# Patient Record
Sex: FEMALE | Race: WHITE | Employment: FULL TIME | URBAN - METROPOLITAN AREA
[De-identification: names, ages, dates, MRNs, and addresses within clinical notes are randomized per-mention and may not be internally consistent; named-entity substitution may affect disease eponyms.]

---

## 2019-06-17 ENCOUNTER — TELEPHONE (OUTPATIENT)
Dept: PRIMARY CARE CLINIC | Age: 39
End: 2019-06-17

## 2019-07-16 ENCOUNTER — OFFICE VISIT (OUTPATIENT)
Dept: PRIMARY CARE CLINIC | Age: 39
End: 2019-07-16

## 2019-07-16 VITALS
RESPIRATION RATE: 18 BRPM | HEART RATE: 72 BPM | TEMPERATURE: 98.4 F | HEIGHT: 64 IN | DIASTOLIC BLOOD PRESSURE: 68 MMHG | OXYGEN SATURATION: 98 % | BODY MASS INDEX: 35.51 KG/M2 | WEIGHT: 208 LBS | SYSTOLIC BLOOD PRESSURE: 127 MMHG

## 2019-07-16 DIAGNOSIS — E66.9 OBESITY (BMI 30-39.9): ICD-10-CM

## 2019-07-16 DIAGNOSIS — N92.6 IRREGULAR PERIODS/MENSTRUAL CYCLES: ICD-10-CM

## 2019-07-16 DIAGNOSIS — R23.2 HOT FLASHES: ICD-10-CM

## 2019-07-16 DIAGNOSIS — E03.9 ACQUIRED HYPOTHYROIDISM: Primary | ICD-10-CM

## 2019-07-16 RX ORDER — GLUCOSAMINE SULFATE 1500 MG
4000 POWDER IN PACKET (EA) ORAL DAILY
COMMUNITY

## 2019-07-16 RX ORDER — MULTIVIT WITH MINERALS/HERBS
2 TABLET ORAL DAILY
COMMUNITY
End: 2021-10-15 | Stop reason: ALTCHOICE

## 2019-07-16 NOTE — PROGRESS NOTES
Chief Complaint   Patient presents with    New Patient    Establish Care    Thyroid Problem     hypothyroidism    Stress    Anxiety    Joint Pain     \"stiff joints\"    Hair/Scalp Problem     hair falling out

## 2019-07-16 NOTE — PROGRESS NOTES
Written by Paulette Hearn, as dictated by Dr. Arash Daniels MD.    Jonelle Martel is a 41 y.o. female. HPI  The patient comes in today to establish care. She is taking Synthroid 150 mcg for hypothyroidism, noting she was switched from her previous medication on 04/30/2019. Patient ran out of her medication yesterday. The patient reports feeling fatigued and hair loss. Denies constipation and insomnia. She eats red meat regularly. Patient would like to have her T4 checked. Patient weighs 208 lbs today and reports weight gain. Her weight gain started after she quit smoking. The patient has been following a healthy diet and is trying to increase her exercise. She admits that she could put more effort into lose weight. Previously she tried the keto diet, but was only able to lose 3 lbs. The patient reports significant hot flashes since her tubal ligation on 03/16/2016. She is also having mood swings, which have worsened. Menstrual cycle is irregular. Pt is taking vitamin D daily. S/p cholecystectomy. Patient Active Problem List   Diagnosis Code    Normal delivery O80        Current Outpatient Medications on File Prior to Visit   Medication Sig Dispense Refill    cholecalciferol (VITAMIN D3) 1,000 unit cap Take 4,000 Units by mouth daily.  b complex vitamins tablet Take 2 Tabs by mouth daily.  levothyroxine (SYNTHROID) 88 mcg tablet Take 150 mcg by mouth Daily (before breakfast).  PRENATAL VITS W-CA,FE,FA,<1MG, (PRENATAL VITAMIN PO) Take 1 Cap by mouth daily. No current facility-administered medications on file prior to visit.         Allergies   Allergen Reactions    Amoxicillin Anaphylaxis    Ampicillin Anaphylaxis    Penicillins Anaphylaxis       Past Medical History:   Diagnosis Date    Diabetes ([facility] [address]     gest DM- diet controlled    Thyroid activity decreased 01/14    hypo       Past Surgical History:   Procedure Laterality Date   DELIVERY ONLY  2004    emergency    HX CHOLECYSTECTOMY      HX OTHER SURGICAL  10/2004    lap choly    HX TUBAL LIGATION  2016       Family History   Problem Relation Age of Onset    Diabetes Maternal Grandmother     Stroke Maternal Grandmother     Dementia Maternal Grandfather     Alcohol abuse Neg Hx     Arthritis-osteo Neg Hx     Asthma Neg Hx     Bleeding Prob Neg Hx     Cancer Neg Hx     Elevated Lipids Neg Hx     Headache Neg Hx     Heart Disease Neg Hx     Hypertension Neg Hx     Lung Disease Neg Hx     Psychiatric Disorder Neg Hx     Migraines Neg Hx     Mental Retardation Neg Hx        Social History     Socioeconomic History    Marital status:      Spouse name: Not on file    Number of children: Not on file    Years of education: Not on file    Highest education level: Not on file   Occupational History    Not on file   Social Needs    Financial resource strain: Not on file    Food insecurity:     Worry: Not on file     Inability: Not on file    Transportation needs:     Medical: Not on file     Non-medical: Not on file   Tobacco Use    Smoking status: Former Smoker     Years: 15.00     Start date: 3/5/2018     Last attempt to quit: 3/5/2018     Years since quittin.3    Smokeless tobacco: Never Used   Substance and Sexual Activity    Alcohol use: Yes     Comment: SOCIALLY ONCE A MONTH    Drug use: No    Sexual activity: Yes     Partners: Male   Lifestyle    Physical activity:     Days per week: Not on file     Minutes per session: Not on file    Stress: Not on file   Relationships    Social connections:     Talks on phone: Not on file     Gets together: Not on file     Attends Hoahaoism service: Not on file     Active member of club or organization: Not on file     Attends meetings of clubs or organizations: Not on file     Relationship status: Not on file    Intimate partner violence:     Fear of current or ex partner: Not on file Emotionally abused: Not on file     Physically abused: Not on file     Forced sexual activity: Not on file   Other Topics Concern    Not on file   Social History Narrative    Not on file       Review of Systems   Constitutional: Positive for diaphoresis and malaise/fatigue. HENT: Negative for congestion. Eyes: Negative for blurred vision and pain. Respiratory: Negative for cough and shortness of breath. Cardiovascular: Negative for chest pain and palpitations. Gastrointestinal: Negative for abdominal pain, constipation and heartburn. Genitourinary: Negative for frequency and urgency. +irreglar menstrual cycle   Musculoskeletal: Negative for joint pain and myalgias. Skin:        +hair loss   Neurological: Negative for dizziness, tingling, sensory change, weakness and headaches. Psychiatric/Behavioral: Negative for depression, memory loss and substance abuse. The patient does not have insomnia.         +mood swings     Visit Vitals  /68   Pulse 72   Temp 98.4 °F (36.9 °C) (Oral)   Resp 18   Ht 5' 4\" (1.626 m)   Wt 208 lb (94.3 kg)   LMP 06/28/2019   SpO2 98%   BMI 35.70 kg/m²       Physical Exam   Constitutional: She is oriented to person, place, and time. She appears well-developed. No distress. Obese   HENT:   Right Ear: External ear normal.   Left Ear: External ear normal.   Nose: Right sinus exhibits no maxillary sinus tenderness. Left sinus exhibits no maxillary sinus tenderness. Eyes: Conjunctivae and EOM are normal. Right eye exhibits no discharge. Left eye exhibits no discharge. Neck: Normal range of motion. Neck supple. No thyromegaly present. Cardiovascular: Normal rate, regular rhythm and normal heart sounds. Pulmonary/Chest: Effort normal and breath sounds normal. She has no wheezes. Abdominal: Soft. Bowel sounds are normal. There is no tenderness. Lymphadenopathy:     She has no cervical adenopathy.    Neurological: She is alert and oriented to person, place, and time. Skin: She is not diaphoretic. Psychiatric: She has a normal mood and affect. Her behavior is normal.   Nursing note and vitals reviewed. ASSESSMENT and PLAN    ICD-10-CM ICD-9-CM    1. Acquired hypothyroidism E03.9 244.9 TSH RFX ON ABNORMAL TO FREE T4      FSH AND LH      CBC W/O DIFF    TSH reflex on abnormal, FSH and LH, and CBC ordered. 2. Obesity (BMI 30-39. 9) E66.9 278.00 Encouraged healthy diet. Advised pt to purchase a FitBit or similar device. Discussed that a healthy lifestyle requires 5,000-7,000 steps daily, but weight loss requires 10,000 steps daily. I recommend the patient download the Weight Watchers rayray to help track food consumption. The patient should not use their weekly points, as weekly points are for weight maintenance and the patient is trying to lose weight. The patient should eat plenty of fruits and vegetables. Encouraged the patient to exercise. 3. Hot flashes R23.2 782.62 FSH and LH ordered. If needed, will refer to OB/GYN for hormonal treatment. 4. Irregular periods/menstrual cycles N92.6 626.4 FSH and LH ordered. If needed, will refer to OB/GYN for hormonal treatment. Discussed her irregular menstrual cycle may be due to hypothyroidism. This plan was reviewed with the patient and patient agrees. All questions were answered. This scribe documentation was reviewed by me and accurately reflects the examination and decisions made by me. This note will not be viewable in [address]

## 2019-07-17 LAB
ERYTHROCYTE [DISTWIDTH] IN BLOOD BY AUTOMATED COUNT: 13.5 % (ref 12.3–15.4)
FSH SERPL-ACNC: 6.9 MIU/ML
HCT VFR BLD AUTO: 39.9 % (ref 34–46.6)
HGB BLD-MCNC: 13.4 G/DL (ref 11.1–15.9)
LH SERPL-ACNC: 7.2 MIU/ML
MCH RBC QN AUTO: 30.4 PG (ref 26.6–33)
MCHC RBC AUTO-ENTMCNC: 33.6 G/DL (ref 31.5–35.7)
MCV RBC AUTO: 91 FL (ref 79–97)
PLATELET # BLD AUTO: 339 X10E3/UL (ref 150–450)
RBC # BLD AUTO: 4.41 X10E6/UL (ref 3.77–5.28)
TSH SERPL DL<=0.005 MIU/L-ACNC: 2.12 UIU/ML (ref 0.45–4.5)
WBC # BLD AUTO: 7.4 X10E3/UL (ref 3.4–10.8)

## 2019-07-18 NOTE — PROGRESS NOTES
Denise Todd, surprisingly, your thyroid number ( TSH) and hormonal result came back fine . Let`s make an appointment so we can discuss next step.

## 2019-07-29 ENCOUNTER — OFFICE VISIT (OUTPATIENT)
Dept: PRIMARY CARE CLINIC | Age: 39
End: 2019-07-29

## 2019-07-29 VITALS
TEMPERATURE: 97.9 F | HEART RATE: 60 BPM | HEIGHT: 64 IN | DIASTOLIC BLOOD PRESSURE: 71 MMHG | SYSTOLIC BLOOD PRESSURE: 125 MMHG | OXYGEN SATURATION: 100 % | RESPIRATION RATE: 16 BRPM | BODY MASS INDEX: 35.71 KG/M2 | WEIGHT: 209.2 LBS

## 2019-07-29 DIAGNOSIS — E66.9 OBESITY (BMI 30-39.9): ICD-10-CM

## 2019-07-29 DIAGNOSIS — E03.9 ACQUIRED HYPOTHYROIDISM: ICD-10-CM

## 2019-07-29 DIAGNOSIS — E66.01 SEVERE OBESITY (HCC): ICD-10-CM

## 2019-07-29 DIAGNOSIS — R53.82 CHRONIC FATIGUE: Primary | ICD-10-CM

## 2019-07-29 DIAGNOSIS — R12 HEART BURN: ICD-10-CM

## 2019-07-29 RX ORDER — OMEPRAZOLE 40 MG/1
40 CAPSULE, DELAYED RELEASE ORAL DAILY
Qty: 30 CAP | Refills: 0 | Status: SHIPPED | OUTPATIENT
Start: 2019-07-29 | End: 2019-08-28

## 2019-07-29 RX ORDER — PHENTERMINE HYDROCHLORIDE 37.5 MG/1
37.5 TABLET ORAL
Qty: 14 TAB | Refills: 0 | Status: SHIPPED | OUTPATIENT
Start: 2019-07-29 | End: 2019-08-12

## 2019-07-29 NOTE — PROGRESS NOTES
Written by Leatha Meredith, as dictated by Dr. Gage Regalado MD.    Yuri Valentino is a 41 y.o. female. HPI  The patient presents today to discuss labs. Labs were drawn on 2019: CBC, FSH and LH, and TSH were normal. Compliant on Synthroid 88 mcg. Today she weighs 209 lbs and has gained weight from 208 lbs on 2019. Patient is still struggling with weight gain, noting she has increased her exercise. She has been watching her diet, reducing carbohydrates and soda. The pt is planning to see a hypnotherapist to help her work through potential issues. Patient tried phen phen as a teenager, but otherwise has not tried medication for weight loss. She has significant heartburn for which she takes OTC Prilosec as needed. Patient is interested in prescription medication for her sxs. Patient has never taken Prozac. Denies cravings for cigarettes. Patient Active Problem List   Diagnosis Code   (none) - all problems resolved or deleted        Current Outpatient Medications on File Prior to Visit   Medication Sig Dispense Refill    cholecalciferol (VITAMIN D3) 1,000 unit cap Take 4,000 Units by mouth daily.  b complex vitamins tablet Take 2 Tabs by mouth daily.  PRENATAL VITS W-CA,FE,FA,<1MG, (PRENATAL VITAMIN PO) Take 1 Cap by mouth daily.  levothyroxine (SYNTHROID) 88 mcg tablet Take 150 mcg by mouth Daily (before breakfast). No current facility-administered medications on file prior to visit.         Allergies   Allergen Reactions    Amoxicillin Anaphylaxis    Ampicillin Anaphylaxis    Penicillins Anaphylaxis       Past Medical History:   Diagnosis Date    Diabetes ([facility] [address]     gest DM- diet controlled    Thyroid activity decreased     hypo       Past Surgical History:   Procedure Laterality Date     DELIVERY ONLY  2004    emergency    HX CHOLECYSTECTOMY      HX OTHER SURGICAL  10/2004    lap choly    HX TUBAL LIGATION  2016       Family History   Problem Relation Age of Onset    Diabetes Maternal Grandmother     Stroke Maternal Grandmother     Dementia Maternal Grandfather     Alcohol abuse Neg Hx     Arthritis-osteo Neg Hx     Asthma Neg Hx     Bleeding Prob Neg Hx     Cancer Neg Hx     Elevated Lipids Neg Hx     Headache Neg Hx     Heart Disease Neg Hx     Hypertension Neg Hx     Lung Disease Neg Hx     Psychiatric Disorder Neg Hx     Migraines Neg Hx     Mental Retardation Neg Hx        Social History     Socioeconomic History    Marital status:      Spouse name: Not on file    Number of children: Not on file    Years of education: Not on file    Highest education level: Not on file   Occupational History    Not on file   Social Needs    Financial resource strain: Not on file    Food insecurity:     Worry: Not on file     Inability: Not on file    Transportation needs:     Medical: Not on file     Non-medical: Not on file   Tobacco Use    Smoking status: Former Smoker     Years: 15.00     Start date: 3/5/2018     Last attempt to quit: 3/5/2018     Years since quittin.4    Smokeless tobacco: Never Used   Substance and Sexual Activity    Alcohol use: Yes     Comment: SOCIALLY ONCE A MONTH    Drug use: No    Sexual activity: Yes     Partners: Male   Lifestyle    Physical activity:     Days per week: Not on file     Minutes per session: Not on file    Stress: Not on file   Relationships    Social connections:     Talks on phone: Not on file     Gets together: Not on file     Attends Restorationist service: Not on file     Active member of club or organization: Not on file     Attends meetings of clubs or organizations: Not on file     Relationship status: Not on file    Intimate partner violence:     Fear of current or ex partner: Not on file     Emotionally abused: Not on file     Physically abused: Not on file     Forced sexual activity: Not on file   Other Topics Concern    Not on file   Social History Narrative    Not on file       Office Visit on 07/16/2019   Component Date Value Ref Range Status    TSH 07/16/2019 2.120  0.450 - 4.500 uIU/mL Final    Luteinizing hormone 07/16/2019 7.2  mIU/mL Final    FSH 07/16/2019 6.9  mIU/mL Final    WBC 07/16/2019 7.4  3.4 - 10.8 x10E3/uL Final    RBC 07/16/2019 4.41  3.77 - 5.28 x10E6/uL Final    HGB 07/16/2019 13.4  11.1 - 15.9 g/dL Final    HCT 07/16/2019 39.9  34.0 - 46.6 % Final    MCV 07/16/2019 91  79 - 97 fL Final    MCH 07/16/2019 30.4  26.6 - 33.0 pg Final    MCHC 07/16/2019 33.6  31.5 - 35.7 g/dL Final    RDW 07/16/2019 13.5  12.3 - 15.4 % Final    PLATELET 85/48/4865 840  150 - 450 x10E3/uL Final       Review of Systems   Constitutional: Positive for malaise/fatigue. Respiratory: Negative for cough and shortness of breath. Gastrointestinal: Positive for heartburn. Negative for abdominal pain. Musculoskeletal: Negative for joint pain and myalgias. Neurological: Negative for dizziness, tingling, sensory change, weakness and headaches. Psychiatric/Behavioral: Negative for depression, memory loss and substance abuse. Visit Vitals  /71 (BP 1 Location: Left arm, BP Patient Position: Sitting)   Pulse 60   Temp 97.9 °F (36.6 °C) (Oral)   Resp 16   Ht 5' 4\" (1.626 m)   Wt 209 lb 3.2 oz (94.9 kg)   LMP 07/29/2019   SpO2 100%   BMI 35.91 kg/m²       Physical Exam   Constitutional: She is oriented to person, place, and time. She appears well-developed. No distress. Obese   HENT:   Right Ear: External ear normal.   Left Ear: External ear normal.   Eyes: Conjunctivae and EOM are normal. Right eye exhibits no discharge. Left eye exhibits no discharge. Neck: Normal range of motion. Neck supple. Cardiovascular: Normal rate and regular rhythm. Pulmonary/Chest: Effort normal and breath sounds normal. She has no wheezes. Abdominal: Soft. Bowel sounds are normal. There is no tenderness.    Lymphadenopathy:     She has no cervical adenopathy. Neurological: She is alert and oriented to person, place, and time. Skin: She is not diaphoretic. Psychiatric: She has a normal mood and affect. Her behavior is normal.   Nursing note and vitals reviewed. ASSESSMENT and PLAN    ICD-10-CM ICD-9-CM    1. Chronic fatigue R53.82 780.79 Phentermine should improve her fatigue. 2. Acquired hypothyroidism E03.9 244.9 Compliant on Synthroid 88 mcg. TSH came back  normal.    3. Obesity (BMI 30-39. 9) E66.9 278.00 phentermine (ADIPEX-P) 37.5 mg tablet script given to patient. Phentermine 37.5 mg prescribed x 2 weeks. Potential side effects were discussed. She should start with 1/2 tab PO after breakfast. She must lose 4-5 lbs in 2 weeks for me to continue prescribing phentermine. EKG will be performed at 2 week follow-up. No history of glaucoma. 4. Heart burn R12 787.1 omeprazole (PRILOSEC) 40 mg capsule sent to pharmacy. Prilosec 40 mg prescribed. Potential side effects were discussed. She should take 1 tab PO daily first thing in the morning, 10 minutes before taking Synthroid. If her sxs do not improve, I will refer to GI. This plan was reviewed with the patient and patient agrees. All questions were answered. This scribe documentation was reviewed by me and accurately reflects the examination and decisions made by me. This note will not be viewable in [address]

## 2019-07-29 NOTE — PROGRESS NOTES
Visit Vitals  /71 (BP 1 Location: Left arm, BP Patient Position: Sitting)   Pulse 60   Temp 97.9 °F (36.6 °C) (Oral)   Resp 16   Ht 5' 4\" (1.626 m)   Wt 209 lb 3.2 oz (94.9 kg)   SpO2 100%   BMI 35.91 kg/m²           Chief Complaint   Patient presents with    Follow-up     Labs     Epigastric Pain     Patient states she has been having  s/s have been present for  a while now            Patient  up to date, patient is not interested in meebee at this time. 1. Have you been to the ER, urgent care clinic since your last visit? Hospitalized since your last visit? Denies     2. Have you seen or consulted any other health care providers outside of the [address] [place] [address] since your last visit? Include any pap smears or colon screening.  OB/GYN

## 2019-08-12 ENCOUNTER — OFFICE VISIT (OUTPATIENT)
Dept: PRIMARY CARE CLINIC | Age: 39
End: 2019-08-12

## 2019-08-12 VITALS
HEIGHT: 64 IN | HEART RATE: 64 BPM | TEMPERATURE: 98.7 F | BODY MASS INDEX: 35 KG/M2 | SYSTOLIC BLOOD PRESSURE: 129 MMHG | WEIGHT: 205 LBS | DIASTOLIC BLOOD PRESSURE: 82 MMHG | RESPIRATION RATE: 16 BRPM | OXYGEN SATURATION: 98 %

## 2019-08-12 DIAGNOSIS — R53.83 OTHER FATIGUE: ICD-10-CM

## 2019-08-12 DIAGNOSIS — Z79.899 MEDICATION MANAGEMENT: ICD-10-CM

## 2019-08-12 DIAGNOSIS — R12 HEART BURN: Primary | ICD-10-CM

## 2019-08-12 DIAGNOSIS — E66.9 OBESITY (BMI 30-39.9): ICD-10-CM

## 2019-08-12 RX ORDER — PHENTERMINE HYDROCHLORIDE 37.5 MG/1
37.5 TABLET ORAL
Qty: 30 TAB | Refills: 0 | Status: SHIPPED | OUTPATIENT
Start: 2019-08-12 | End: 2019-09-11

## 2019-08-12 NOTE — PROGRESS NOTES
Written by Alta Scott, as dictated by Dr. Miah Sanders MD.    Bernie Wheat is a 41 y.o. female. HPI  The patient presents today for medication evaluation follow-up of phentermine. EKG showed: Sinus rhythm, within normal limits. Pt weighs 205 lbs today, which has decreased from 209 lbs in 07/29/19. Pt has been taking phentermine 1/2 tab daily after work, and after she eats at night. She reports that she has been feeling tired due to lack of sleep, and being easy to wake at night, which she attributes to taking the medication late in the morning . She reports an improvement in her cravings, and a decrease in \"sick\" hunger, and more energy. She does not feel fatigued as before, and attributes the tiredness she currently feels due to lack of sleep. She reports that she has been drinking more water. Pt reports that she is walking more, and is trying to exercise when she can, incorporating exercise into her daily routine. She does not have a fitbit at this time, and is not watching her steps. She denies constipation. She is compliant on omeprazole 40 mg, and reports an improvement in her heartburn. She reports that she has had a stressful time at home, due to issues with mice at home, water heater breaking. Patient Active Problem List   Diagnosis Code    Severe obesity ([facility] [address] E66.01        Current Outpatient Medications on File Prior to Visit   Medication Sig Dispense Refill    phentermine (ADIPEX-P) 37.5 mg tablet Take 1 Tab by mouth every morning for 14 doses. Max Daily Amount: 37.5 mg. 14 Tab 0    omeprazole (PRILOSEC) 40 mg capsule Take 1 Cap by mouth daily for 30 days. 30 Cap 0    cholecalciferol (VITAMIN D3) 1,000 unit cap Take 4,000 Units by mouth daily.  b complex vitamins tablet Take 2 Tabs by mouth daily.  PRENATAL VITS W-CA,FE,FA,<1MG, (PRENATAL VITAMIN PO) Take 1 Cap by mouth daily.       levothyroxine (SYNTHROID) 88 mcg tablet Take 150 mcg by mouth Daily (before breakfast). No current facility-administered medications on file prior to visit.         Allergies   Allergen Reactions    Amoxicillin Anaphylaxis    Ampicillin Anaphylaxis    Penicillins Anaphylaxis       Past Medical History:   Diagnosis Date    Diabetes ([address]     gest DM- diet controlled    Thyroid activity decreased     hypo       Past Surgical History:   Procedure Laterality Date     DELIVERY ONLY  2004    emergency    HX CHOLECYSTECTOMY      HX OTHER SURGICAL  10/2004    lap choly    HX TUBAL LIGATION  2016       Family History   Problem Relation Age of Onset    Diabetes Maternal Grandmother     Stroke Maternal Grandmother     Dementia Maternal Grandfather     Alcohol abuse Neg Hx     Arthritis-osteo Neg Hx     Asthma Neg Hx     Bleeding Prob Neg Hx     Cancer Neg Hx     Elevated Lipids Neg Hx     Headache Neg Hx     Heart Disease Neg Hx     Hypertension Neg Hx     Lung Disease Neg Hx     Psychiatric Disorder Neg Hx     Migraines Neg Hx     Mental Retardation Neg Hx        Social History     Socioeconomic History    Marital status:      Spouse name: Not on file    Number of children: Not on file    Years of education: Not on file    Highest education level: Not on file   Occupational History    Not on file   Social Needs    Financial resource strain: Not on file    Food insecurity:     Worry: Not on file     Inability: Not on file    Transportation needs:     Medical: Not on file     Non-medical: Not on file   Tobacco Use    Smoking status: Former Smoker     Years: 15.00     Start date: 3/5/2018     Last attempt to quit: 3/5/2018     Years since quittin.4    Smokeless tobacco: Never Used   Substance and Sexual Activity    Alcohol use: Yes     Comment: SOCIALLY ONCE A MONTH    Drug use: No    Sexual activity: Yes     Partners: Male   Lifestyle    Physical activity:     Days per week: Not on file     Minutes per session: Not on file    Stress: Not on file   Relationships    Social connections:     Talks on phone: Not on file     Gets together: Not on file     Attends Scientologist service: Not on file     Active member of club or organization: Not on file     Attends meetings of clubs or organizations: Not on file     Relationship status: Not on file    Intimate partner violence:     Fear of current or ex partner: Not on file     Emotionally abused: Not on file     Physically abused: Not on file     Forced sexual activity: Not on file   Other Topics Concern    Not on file   Social History Narrative    Not on file       Office Visit on 07/16/2019   Component Date Value Ref Range Status    TSH 07/16/2019 2.120  0.450 - 4.500 uIU/mL Final    Luteinizing hormone 07/16/2019 7.2  mIU/mL Final    [address] 07/16/2019 6.9  mIU/mL Final    WBC 07/16/2019 7.4  3.4 - 10.8 x10E3/uL Final    RBC 07/16/2019 4.41  3.77 - 5.28 x10E6/uL Final    HGB 07/16/2019 13.4  11.1 - 15.9 g/dL Final    HCT 07/16/2019 39.9  34.0 - 46.6 % Final    MCV 07/16/2019 91  79 - 97 fL Final    MCH 07/16/2019 30.4  26.6 - 33.0 pg Final    MCHC 07/16/2019 33.6  31.5 - 35.7 g/dL Final    RDW 07/16/2019 13.5  12.3 - 15.4 % Final    PLATELET 60/44/3195 476  150 - 450 x10E3/uL Final       Review of Systems   Constitutional: Positive for weight loss (intentional). Negative for malaise/fatigue. Respiratory: Negative for shortness of breath. Gastrointestinal: Positive for heartburn (well managed with omeprazole). Musculoskeletal: Negative for joint pain and myalgias. Neurological: Negative for dizziness and headaches. Psychiatric/Behavioral: Negative for depression and substance abuse. The patient is not nervous/anxious.       Visit Vitals  /82 (BP 1 Location: Left arm, BP Patient Position: Sitting)   Pulse 64   Temp 98.7 °F (37.1 °C) (Oral)   Resp 16   Ht 5' 4\" (1.626 m)   Wt 205 lb (93 kg)   LMP 07/29/2019   SpO2 98%   BMI 35.19 kg/m² Physical Exam   Constitutional: She is oriented to person, place, and time. She appears well-developed. No distress. obese   HENT:   Head: Normocephalic and atraumatic. Neck: Normal range of motion. Neck supple. Cardiovascular: Normal rate, regular rhythm, normal heart sounds and intact distal pulses. Exam reveals no gallop and no friction rub. No murmur heard. Pulmonary/Chest: Effort normal and breath sounds normal. No respiratory distress. She has no wheezes. She has no rales. She exhibits no tenderness. Abdominal: Soft. Bowel sounds are normal. She exhibits no distension. There is no tenderness. Musculoskeletal: Normal range of motion. She exhibits no edema. Neurological: She is alert and oriented to person, place, and time. Skin: She is not diaphoretic. Psychiatric: She has a normal mood and affect. Her behavior is normal. Judgment and thought content normal.   Nursing note and vitals reviewed. ASSESSMENT and PLAN    ICD-10-CM ICD-9-CM    1. Heart burn R12 787.1 Well managed with Omeprazole. Continue for 4 weeks. 2. Other fatigue R53.83 780.79 No treatment at this time. Pt was advised to take phentermine 37.5 mg in the morning. 3. Medication management Z79.899 V58.69 AMB POC EKG ROUTINE W/ 12 LEADS, INTER & REP    Phentermine 37.5 mg prescribed x 1 month. Potential side effects were discussed. Pt should take 1/2 tab in the morning. Patient counseled about birth control and she should take extra precautions to prevent pregnancy on this medication. EKG was performed in office on 08/18/19 and shows sinus rhythm. No history of glaucoma. 4. Obesity (BMI 30-39. 9) E66.9 278.00 phentermine (ADIPEX-P) 37.5 mg tablet script given to pt. Commended on weight loss. Phentermine 37.5 mg prescribed x 1 month. Potential side effects were discussed. Pt should take 1/2 tab in the morning.  Patient counseled about birth control and she should take extra precautions to prevent pregnancy on this medication. EKG on 08/18/19 shows sinus rhythm. . No history of glaucoma. Pt's next goal is to try to lose 10 lbs by the time of her next follow-up. I recommend the patient download the Weight [employer] rayray to help track food consumption. The patient should not use their weekly points, as weekly points are for weight maintenance and the patient is trying to lose weight. The patient should eat plenty of fruits and vegetables. Encouraged the patient to exercise. This plan was reviewed with the patient and patient agrees. All questions were answered. This scribe documentation was reviewed by me and accurately reflects the examination and decisions made by me. This note will not be viewable in [address]

## 2019-08-12 NOTE — PROGRESS NOTES
Visit Vitals  /82 (BP 1 Location: Left arm, BP Patient Position: Sitting)   Pulse 64   Temp 98.7 °F (37.1 °C) (Oral)   Resp 16   Ht 5' 4\" (1.626 m)   Wt 205 lb (93 kg)   SpO2 98%   BMI 35.19 kg/m²             Chief Complaint   Patient presents with    Medication Evaluation     Phentermine; EKG              HM Due WNL/Reviewed. Patient is not interested in [address] [place] [address] wikifolio at this time. 1. Have you been to the ER, urgent care clinic since your last visit? Hospitalized since your last visit? Denies     2. Have you seen or consulted any other health care providers outside of the [address] [place] [address] since your last visit? Include any pap smears or colon screening.  Denies

## 2019-08-27 ENCOUNTER — PATIENT MESSAGE (OUTPATIENT)
Dept: PRIMARY CARE CLINIC | Age: 39
End: 2019-08-27

## 2019-08-27 DIAGNOSIS — R12 HEART BURN: ICD-10-CM

## 2019-08-27 DIAGNOSIS — K21.9 GASTROESOPHAGEAL REFLUX DISEASE, ESOPHAGITIS PRESENCE NOT SPECIFIED: Primary | ICD-10-CM

## 2019-08-27 RX ORDER — OMEPRAZOLE 40 MG/1
40 CAPSULE, DELAYED RELEASE ORAL DAILY
Qty: 30 CAP | Refills: 0 | Status: CANCELLED | OUTPATIENT
Start: 2019-08-27 | End: 2019-09-26

## 2019-08-27 RX ORDER — OMEPRAZOLE 40 MG/1
40 CAPSULE, DELAYED RELEASE ORAL DAILY
Qty: 90 CAP | Refills: 0 | Status: SHIPPED | OUTPATIENT
Start: 2019-08-27 | End: 2021-10-15 | Stop reason: ALTCHOICE

## 2019-08-27 NOTE — TELEPHONE ENCOUNTER
Last office visit 8/12/2019  Last med refill done today and sent to [facility] and confirmed receipt done

## 2019-09-09 ENCOUNTER — OFFICE VISIT (OUTPATIENT)
Dept: PRIMARY CARE CLINIC | Age: 39
End: 2019-09-09

## 2019-09-09 VITALS
SYSTOLIC BLOOD PRESSURE: 113 MMHG | RESPIRATION RATE: 16 BRPM | HEIGHT: 64 IN | WEIGHT: 198.2 LBS | BODY MASS INDEX: 33.84 KG/M2 | HEART RATE: 81 BPM | DIASTOLIC BLOOD PRESSURE: 78 MMHG | OXYGEN SATURATION: 99 % | TEMPERATURE: 98.1 F

## 2019-09-09 DIAGNOSIS — K76.0 FATTY LIVER: ICD-10-CM

## 2019-09-09 DIAGNOSIS — E66.9 OBESITY (BMI 30-39.9): ICD-10-CM

## 2019-09-09 DIAGNOSIS — E03.9 ACQUIRED HYPOTHYROIDISM: Primary | ICD-10-CM

## 2019-09-09 DIAGNOSIS — R12 HEART BURN: ICD-10-CM

## 2019-09-09 RX ORDER — FAMOTIDINE 40 MG/1
40 TABLET, FILM COATED ORAL DAILY
Qty: 30 TAB | Refills: 2 | Status: SHIPPED | OUTPATIENT
Start: 2019-09-09 | End: 2019-12-08

## 2019-09-09 RX ORDER — PHENTERMINE HYDROCHLORIDE 37.5 MG/1
37.5 TABLET ORAL
Qty: 30 TAB | Refills: 0 | Status: SHIPPED | OUTPATIENT
Start: 2019-09-09 | End: 2019-10-09

## 2019-09-09 NOTE — PROGRESS NOTES
Written by Neeru Tamez, as dictated by Dr. Danilo Wiley MD.    Magdaleno Hensley is a 41 y.o. female. HPI  The patient presents today for follow up. She is taking 1/2 tab of phentermine 37.5 mg daily. She weighs 198 lbs today and weighed 205 lbs on 08/12/19. She reports that she is feeling bloated today and tired, but attributes it to her menstrual cycle. She reports that she can focus more at work while on phentermine, and has been drinking more water. She has also noticed that it has been easier to make decisions with her diet. She notes that she has been doing more exercise in terms of moving more. She notes that she has been getting about 8,000 steps daily during the work-week and 20,000 steps on the weekend. Denies palpitations or constipation. She has started taking 1 tab rather than 1/2 tab, as she reported cravings in the evening. She notes that she will have eye pain due to computer work, but otherwise, no new eye problems. She reports she will be getting her eye checked in 10/2019. She stopped taking Omeprazole 40 mg 2 weeks ago, but reports that her heartburn returned on 09/08/19. She attributes it to drinking a few beers on 09/07/19. She notes that she was still a little airy and burping, even while on omeprazole. She would like to restart a medication for the heartburn. She is compliant on Synthroid 88 mcg. She reports that she is starting to feel more leveled out while she is on this brand medication. TSH RFX on Abnormal was normal on 07/16/19. She reports a hx of \"gallbladder issues\" while she was pregnant and gallbladder attack a few months after giving birth. She was told that she might have a fatty liver.       Patient Active Problem List   Diagnosis Code    Severe obesity ([facility] [address] E66.01    Acquired hypothyroidism E03.9        Current Outpatient Medications on File Prior to Visit   Medication Sig Dispense Refill    omeprazole (PRILOSEC) 40 mg capsule Take 1 Cap by mouth daily. 90 Cap 0    phentermine (ADIPEX-P) 37.5 mg tablet Take 1 Tab by mouth every morning for 30 days. Max Daily Amount: 37.5 mg. 30 Tab 0    cholecalciferol (VITAMIN D3) 1,000 unit cap Take 4,000 Units by mouth daily.  b complex vitamins tablet Take 2 Tabs by mouth daily.  levothyroxine (SYNTHROID) 88 mcg tablet Take 150 mcg by mouth Daily (before breakfast).  PRENATAL VITS W-CA,FE,FA,<1MG, (PRENATAL VITAMIN PO) Take 1 Cap by mouth daily. No current facility-administered medications on file prior to visit.         Allergies   Allergen Reactions    Amoxicillin Anaphylaxis    Ampicillin Anaphylaxis    Penicillins Anaphylaxis       Past Medical History:   Diagnosis Date    Diabetes ([facility] [address]     gest DM- diet controlled    Thyroid activity decreased     hypo       Past Surgical History:   Procedure Laterality Date     DELIVERY ONLY  2004    emergency    HX CHOLECYSTECTOMY      HX OTHER SURGICAL  10/2004    lap choly    HX TUBAL LIGATION  2016       Family History   Problem Relation Age of Onset    Diabetes Maternal Grandmother     Stroke Maternal Grandmother     Dementia Maternal Grandfather     Alcohol abuse Neg Hx     Arthritis-osteo Neg Hx     Asthma Neg Hx     Bleeding Prob Neg Hx     Cancer Neg Hx     Elevated Lipids Neg Hx     Headache Neg Hx     Heart Disease Neg Hx     Hypertension Neg Hx     Lung Disease Neg Hx     Psychiatric Disorder Neg Hx     Migraines Neg Hx     Mental Retardation Neg Hx        Social History     Socioeconomic History    Marital status:      Spouse name: Not on file    Number of children: Not on file    Years of education: Not on file    Highest education level: Not on file   Occupational History    Not on file   Social Needs    Financial resource strain: Not on file    Food insecurity:     Worry: Not on file     Inability: Not on file    Transportation needs:     Medical: Not on file     Non-medical: Not on file   Tobacco Use    Smoking status: Former Smoker     Years: 15.00     Start date: 3/5/2018     Last attempt to quit: 3/5/2018     Years since quittin.5    Smokeless tobacco: Never Used   Substance and Sexual Activity    Alcohol use: Yes     Comment: SOCIALLY ONCE A MONTH    Drug use: No    Sexual activity: Yes     Partners: Male   Lifestyle    Physical activity:     Days per week: Not on file     Minutes per session: Not on file    Stress: Not on file   Relationships    Social connections:     Talks on phone: Not on file     Gets together: Not on file     Attends Adventism service: Not on file     Active member of club or organization: Not on file     Attends meetings of clubs or organizations: Not on file     Relationship status: Not on file    Intimate partner violence:     Fear of current or ex partner: Not on file     Emotionally abused: Not on file     Physically abused: Not on file     Forced sexual activity: Not on file   Other Topics Concern    Not on file   Social History Narrative    Not on file       Office Visit on 2019   Component Date Value Ref Range Status    TSH 2019 2.120  0.450 - 4.500 uIU/mL Final    Luteinizing hormone 2019 7.2  mIU/mL Final    [facility] 2019 6.9  mIU/mL Final    WBC 2019 7.4  3.4 - 10.8 x10E3/uL Final    RBC 2019 4.41  3.77 - 5.28 x10E6/uL Final    HGB 2019 13.4  11.1 - 15.9 g/dL Final    HCT 2019 39.9  34.0 - 46.6 % Final    MCV 2019 91  79 - 97 fL Final    MCH 2019 30.4  26.6 - 33.0 pg Final    MCHC 2019 33.6  31.5 - 35.7 g/dL Final    RDW 2019 13.5  12.3 - 15.4 % Final    PLATELET 562 252  150 - 450 x10E3/uL Final       Review of Systems   Constitutional: Positive for weight loss (intentional). Negative for malaise/fatigue. Respiratory: Negative for shortness of breath. Cardiovascular: Negative for chest pain and palpitations. Gastrointestinal: Positive for heartburn. Negative for constipation and diarrhea. Musculoskeletal: Negative for joint pain and myalgias. Neurological: Negative for dizziness and headaches. Psychiatric/Behavioral: Negative for depression and substance abuse. The patient is not nervous/anxious and does not have insomnia. Visit Vitals  /78 (BP 1 Location: Left arm, BP Patient Position: Sitting)   Pulse 81   Temp 98.1 °F (36.7 °C) (Oral)   Resp 16   Ht 5' 4\" (1.626 m)   Wt 198 lb 3.2 oz (89.9 kg)   SpO2 99%   BMI 34.02 kg/m²       Physical Exam   Constitutional: She is oriented to person, place, and time. She appears well-developed. No distress. obese   HENT:   Head: Normocephalic and atraumatic. Right Ear: External ear normal.   Left Ear: External ear normal.   Eyes: Pupils are equal, round, and reactive to light. Conjunctivae and EOM are normal. Right eye exhibits no discharge. Left eye exhibits no discharge. Neck: Normal range of motion. Neck supple. Cardiovascular: Normal rate, regular rhythm and normal heart sounds. Exam reveals no gallop and no friction rub. No murmur heard. Pulmonary/Chest: Effort normal and breath sounds normal. She has no wheezes. Abdominal: Soft. Bowel sounds are normal. There is no tenderness. Musculoskeletal: Normal range of motion. Neurological: She is alert and oriented to person, place, and time. She has normal reflexes. Skin: She is not diaphoretic. Psychiatric: She has a normal mood and affect. Her behavior is normal. Thought content normal.   Nursing note and vitals reviewed. ASSESSMENT and PLAN    ICD-10-CM ICD-9-CM    1. Acquired hypothyroidism E03.9 244.9 Compliant on Synthroid. Advised pt that we may change to NP thyroid/T3 if pt does not feel well after stopping phentermine. 2. Obesity (BMI 30-39. 9) E66.9 278.00 phentermine (ADIPEX-P) 37.5 mg tablet script given to pt. Phentermine 37.5 mg prescribed x 1 month.  Potential side effects were discussed. Pt should take 1 tab daily. Patient counseled about birth control and she should take extra precautions to prevent pregnancy on this medication. EKG performed in office on 08/18/19 showed sinus rhythm. No history of glaucoma. 3. Heart burn R12 787.1 famotidine (PEPCID) 40 mg tablet sent to pharmacy. Pepcid 40 mg prescribed. Potential side effects were discussed. Advised pt that Omeprazole can cause kidney problems when used long-term. Advised pt that we will address the fatty liver issue after pt continues to lose weight, as weight loss is the treatment for fatty liver disease. May order an US to determine the status of fatty liver. This plan was reviewed with the patient and patient agrees. All questions were answered. This scribe documentation was reviewed by me and accurately reflects the examination and decisions made by me. This note will not be viewable in [address]

## 2019-09-09 NOTE — PROGRESS NOTES
Brian Donoavn is a 41 y.o. female     Chief Complaint   Patient presents with    Thyroid Problem     follow up    Heartburn     follow up       Visit Vitals  /78 (BP 1 Location: Left arm, BP Patient Position: Sitting)   Pulse 81   Temp 98.1 °F (36.7 °C) (Oral)   Resp 16   Ht 5' 4\" (1.626 m)   Wt 198 lb 3.2 oz (89.9 kg)   SpO2 99%   BMI 34.02 kg/m²       There are no preventive care reminders to display for this patient. 1. Have you been to the ER, urgent care clinic since your last visit? Hospitalized since your last visit? No    2. Have you seen or consulted any other health care providers outside of the [address] [place] [address] since your last visit? Include any pap smears or colon screening.  No

## 2019-12-18 RX ORDER — LEVOTHYROXINE SODIUM 150 UG/1
150 TABLET ORAL
Qty: 90 TAB | Refills: 0 | Status: SHIPPED | OUTPATIENT
Start: 2019-12-18 | End: 2019-12-20 | Stop reason: SDUPTHER

## 2019-12-20 RX ORDER — LEVOTHYROXINE SODIUM 150 UG/1
150 TABLET ORAL
Qty: 10 TAB | Refills: 0 | Status: SHIPPED | OUTPATIENT
Start: 2019-12-20 | End: 2019-12-30

## 2020-04-13 DIAGNOSIS — E03.9 ACQUIRED HYPOTHYROIDISM: Primary | ICD-10-CM

## 2020-04-16 DIAGNOSIS — E03.9 ACQUIRED HYPOTHYROIDISM: Primary | ICD-10-CM

## 2020-04-17 LAB
ALBUMIN SERPL-MCNC: 4.5 G/DL (ref 3.8–4.8)
ALBUMIN/GLOB SERPL: 1.9 {RATIO} (ref 1.2–2.2)
ALP SERPL-CCNC: 65 IU/L (ref 39–117)
ALT SERPL-CCNC: 26 IU/L (ref 0–32)
AST SERPL-CCNC: 17 IU/L (ref 0–40)
BILIRUB SERPL-MCNC: 0.3 MG/DL (ref 0–1.2)
BUN SERPL-MCNC: 14 MG/DL (ref 6–20)
BUN/CREAT SERPL: 19 (ref 9–23)
CALCIUM SERPL-MCNC: 9.3 MG/DL (ref 8.7–10.2)
CHLORIDE SERPL-SCNC: 100 MMOL/L (ref 96–106)
CO2 SERPL-SCNC: 22 MMOL/L (ref 20–29)
CREAT SERPL-MCNC: 0.75 MG/DL (ref 0.57–1)
ERYTHROCYTE [DISTWIDTH] IN BLOOD BY AUTOMATED COUNT: 12.6 % (ref 11.7–15.4)
GLOBULIN SER CALC-MCNC: 2.4 G/DL (ref 1.5–4.5)
GLUCOSE SERPL-MCNC: 108 MG/DL (ref 65–99)
HCT VFR BLD AUTO: 42.2 % (ref 34–46.6)
HGB BLD-MCNC: 14.2 G/DL (ref 11.1–15.9)
MCH RBC QN AUTO: 30.1 PG (ref 26.6–33)
MCHC RBC AUTO-ENTMCNC: 33.6 G/DL (ref 31.5–35.7)
MCV RBC AUTO: 90 FL (ref 79–97)
PLATELET # BLD AUTO: 348 X10E3/UL (ref 150–450)
POTASSIUM SERPL-SCNC: 4.7 MMOL/L (ref 3.5–5.2)
PROT SERPL-MCNC: 6.9 G/DL (ref 6–8.5)
RBC # BLD AUTO: 4.71 X10E6/UL (ref 3.77–5.28)
SODIUM SERPL-SCNC: 137 MMOL/L (ref 134–144)
TSH SERPL DL<=0.005 MIU/L-ACNC: 1 UIU/ML (ref 0.45–4.5)
WBC # BLD AUTO: 7.9 X10E3/UL (ref 3.4–10.8)

## 2020-04-22 ENCOUNTER — VIRTUAL VISIT (OUTPATIENT)
Dept: PRIMARY CARE CLINIC | Age: 40
End: 2020-04-22

## 2020-04-22 ENCOUNTER — PATIENT MESSAGE (OUTPATIENT)
Dept: PRIMARY CARE CLINIC | Age: 40
End: 2020-04-22

## 2020-04-22 ENCOUNTER — DOCUMENTATION ONLY (OUTPATIENT)
Dept: PRIMARY CARE CLINIC | Age: 40
End: 2020-04-22

## 2020-04-22 ENCOUNTER — TELEPHONE (OUTPATIENT)
Dept: PRIMARY CARE CLINIC | Age: 40
End: 2020-04-22

## 2020-04-22 DIAGNOSIS — E03.9 ACQUIRED HYPOTHYROIDISM: Primary | ICD-10-CM

## 2020-04-22 DIAGNOSIS — E66.9 OBESITY (BMI 30.0-34.9): ICD-10-CM

## 2020-04-22 DIAGNOSIS — F34.1 DYSTHYMIA: ICD-10-CM

## 2020-04-22 DIAGNOSIS — R53.83 OTHER FATIGUE: ICD-10-CM

## 2020-04-22 DIAGNOSIS — E03.9 ACQUIRED HYPOTHYROIDISM: ICD-10-CM

## 2020-04-22 PROBLEM — E66.01 SEVERE OBESITY (HCC): Status: RESOLVED | Noted: 2019-07-29 | Resolved: 2020-04-22

## 2020-04-22 RX ORDER — LEVOTHYROXINE SODIUM 150 UG/1
150 TABLET ORAL
Qty: 90 TAB | Refills: 0 | Status: SHIPPED | OUTPATIENT
Start: 2020-04-22 | End: 2020-07-21

## 2020-04-22 RX ORDER — BUPROPION HYDROCHLORIDE 100 MG/1
100 TABLET ORAL 2 TIMES DAILY
Qty: 60 TAB | Refills: 0 | Status: SHIPPED | OUTPATIENT
Start: 2020-04-22 | End: 2020-05-22

## 2020-04-22 RX ORDER — LEVOTHYROXINE SODIUM 150 UG/1
150 TABLET ORAL
Qty: 90 TAB | Refills: 0 | Status: SHIPPED | OUTPATIENT
Start: 2020-04-22 | End: 2020-04-27 | Stop reason: SDUPTHER

## 2020-04-22 NOTE — TELEPHONE ENCOUNTER
Returned call to patient regarding medication question. No answer.    Unable to leave message due to mailbox is full

## 2020-04-22 NOTE — PROGRESS NOTES
Written by Brenda Mazariegos, as dictated by Dr. Lo Barrera MD.    Ricardo Ramirez is a 42 y.o. female. HPI  I was in the office while conducting this encounter. Consent:  She and/or her healthcare decision maker is aware that this patient-initiated Telehealth encounter is a billable service, with coverage as determined by her insurance carrier. She is aware that she may receive a bill and has provided verbal consent to proceed: Yes    This virtual visit was conducted via [employer]. Pursuant to the emergency declaration under the [facility], [facility] waiver authority and the [employer] Act, this Virtual  Visit was conducted to reduce the patient's risk of exposure to COVID-19 and provide continuity of care for an established patient. Services were provided through a video synchronous discussion virtually to substitute for in-person clinic visit. Due to this being a TeleHealth evaluation, many elements of the physical examination are unable to be assessed. The patient presents today for a follow-up. She had blood work done on 4/13/2020 her TSH was normal at 1.000, her glucose was elevated at 108. She is still feeling fatigued. She does not feel tired in the morning but she is sleeping more than usual. She has felt anxious lately due to her busy life, her children being home, and her work schedule. She feels like her patience is thinning and has felt very low in her mood. She reports her mood fluctuates and when she has PMS  her mood gets worse. She has a hx of hypothyroidism. She is compliant on Synthroid 150 mcg. She is requesting a refill of Synthroid. She reports she is trying to eat more sensible carbs. She has stopped drinking sodas. She goes for walks in the afternoon with her family. She doesnt get much activity because she is sometimes too tired.  She does feel much better when she does do physical activity. She has started to use MCT oil which is derived from coconuts. She reports she tried Wellbutrin many years ago to quit smoking. She did try Phentermine before and she reports it did help her. Patient Active Problem List   Diagnosis Code    Severe obesity ([facility] [address] E66.01    Acquired hypothyroidism E03.9        Current Outpatient Medications on File Prior to Visit   Medication Sig Dispense Refill    omeprazole (PRILOSEC) 40 mg capsule Take 1 Cap by mouth daily. 90 Cap 0    cholecalciferol (VITAMIN D3) 1,000 unit cap Take 4,000 Units by mouth daily.  b complex vitamins tablet Take 2 Tabs by mouth daily.  PRENATAL VITS W-CA,FE,FA,<1MG, (PRENATAL VITAMIN PO) Take 1 Cap by mouth daily. No current facility-administered medications on file prior to visit.         Allergies   Allergen Reactions    Amoxicillin Anaphylaxis    Ampicillin Anaphylaxis    Penicillins Anaphylaxis       Past Medical History:   Diagnosis Date    Diabetes ([facility] [address]     gest DM- diet controlled    Thyroid activity decreased     hypo       Past Surgical History:   Procedure Laterality Date     DELIVERY ONLY  2004    emergency    HX CHOLECYSTECTOMY      HX OTHER SURGICAL  10/2004    lap choly    HX TUBAL LIGATION  2016       Family History   Problem Relation Age of Onset    Diabetes Maternal Grandmother     Stroke Maternal Grandmother     Dementia Maternal Grandfather     Alcohol abuse Neg Hx     Arthritis-osteo Neg Hx     Asthma Neg Hx     Bleeding Prob Neg Hx     Cancer Neg Hx     Elevated Lipids Neg Hx     Headache Neg Hx     Heart Disease Neg Hx     Hypertension Neg Hx     Lung Disease Neg Hx     Psychiatric Disorder Neg Hx     Migraines Neg Hx     Mental Retardation Neg Hx        Social History     Socioeconomic History    Marital status:      Spouse name: Not on file    Number of children: Not on file    Years of education: Not on file    Highest education level: Not on file   Occupational History    Not on file   Social Needs    Financial resource strain: Not on file    Food insecurity     Worry: Not on file     Inability: Not on file    Transportation needs     Medical: Not on file     Non-medical: Not on file   Tobacco Use    Smoking status: Former Smoker     Years: 15.00     Start date: 3/5/2018     Last attempt to quit: 3/5/2018     Years since quittin.1    Smokeless tobacco: Never Used   Substance and Sexual Activity    Alcohol use: Yes     Comment: SOCIALLY ONCE A MONTH    Drug use: No    Sexual activity: Yes     Partners: Male   Lifestyle    Physical activity     Days per week: Not on file     Minutes per session: Not on file    Stress: Not on file   Relationships    Social connections     Talks on phone: Not on file     Gets together: Not on file     Attends [employer] service: Not on file     Active member of club or organization: Not on file     Attends meetings of clubs or organizations: Not on file     Relationship status: Not on file    Intimate partner violence     Fear of current or ex partner: Not on file     Emotionally abused: Not on file     Physically abused: Not on file     Forced sexual activity: Not on file   Other Topics Concern    Not on file   Social History Narrative    Not on file       Orders Only on 2020   Component Date Value Ref Range Status    Glucose 2020 108* 65 - 99 mg/dL Final    BUN 2020 14  6 - 20 mg/dL Final    Creatinine 2020 0.75  0.57 - 1.00 mg/dL Final    GFR est non-AA 2020 102  >59 mL/min/1.73 Final    GFR est AA 2020 118  >59 mL/min/1.73 Final    BUN/Creatinine ratio 2020 19  9 - 23 Final    Sodium 2020 137  134 - 144 mmol/L Final    Potassium 2020 4.7  3.5 - 5.2 mmol/L Final    Chloride 2020 100  96 - 106 mmol/L Final    CO2 2020 22  20 - 29 mmol/L Final    Calcium 2020 9.3  8.7 - 10.2 mg/dL Final    Protein, total 04/16/2020 6.9  6.0 - 8.5 g/dL Final    Albumin 04/16/2020 4.5  3.8 - 4.8 g/dL Final    GLOBULIN, TOTAL 04/16/2020 2.4  1.5 - 4.5 g/dL Final    A-G Ratio 04/16/2020 1.9  1.2 - 2.2 Final    Bilirubin, total 04/16/2020 0.3  0.0 - 1.2 mg/dL Final    Alk. phosphatase 04/16/2020 65  39 - 117 IU/L Final    AST (SGOT) 04/16/2020 17  0 - 40 IU/L Final    ALT (SGPT) 04/16/2020 26  0 - 32 IU/L Final    TSH 04/16/2020 1.000  0.450 - 4.500 uIU/mL Final    WBC 04/16/2020 7.9  3.4 - 10.8 x10E3/uL Final    RBC 04/16/2020 4.71  3.77 - 5.28 x10E6/uL Final    HGB 04/16/2020 14.2  11.1 - 15.9 g/dL Final    HCT 04/16/2020 42.2  34.0 - 46.6 % Final    MCV 04/16/2020 90  79 - 97 fL Final    MCH 04/16/2020 30.1  26.6 - 33.0 pg Final    MCHC 04/16/2020 33.6  31.5 - 35.7 g/dL Final    RDW 04/16/2020 12.6  11.7 - 15.4 % Final    PLATELET 86/81/5113 973  150 - 450 x10E3/uL Final     Review of Systems   Constitutional: Positive for malaise/fatigue. HENT: Negative for congestion. Eyes: Negative for blurred vision. Respiratory: Negative for shortness of breath. Cardiovascular: Negative for chest pain. Gastrointestinal: Negative for constipation, diarrhea and heartburn. Genitourinary: Negative for dysuria. Musculoskeletal: Negative for myalgias. Neurological: Negative for dizziness and headaches. Psychiatric/Behavioral: Negative for depression and substance abuse. The patient is nervous/anxious. The patient does not have insomnia. There were no vitals taken for this visit. Physical Exam  Constitutional:       General: She is not in acute distress. Appearance: She is well-developed. She is not diaphoretic. HENT:      Head: Normocephalic and atraumatic. Nose: No congestion. Eyes:      General:         Right eye: No discharge. Left eye: No discharge.       Conjunctiva/sclera: Conjunctivae normal.   Pulmonary:      Effort: Pulmonary effort is normal. No respiratory distress. Neurological:      Mental Status: She is alert and oriented to person, place, and time. Psychiatric:         Behavior: Behavior normal.         Thought Content: Thought content normal.         ASSESSMENT and PLAN    ICD-10-CM ICD-9-CM    1. Acquired hypothyroidism E03.9 244.9 Synthroid 150 mcg sent to pharmacy    Synthroid 150 mcg refilled. 2. Dysthymia F34.1 300.4 Advised pt to walk 5,000 steps to try to walk and eat well. Explained that MCT oil can be good but I  believe it is high in saturated fat. 3. Obesity (BMI 30.0-34. 9) E66.9 278.00 Advised pt to exercise more and try to get in 5,000 steps. Explained that Keto diet can be beneficial but be careful about high cholesterol. 4. Other fatigue R53.83 780.79 Wellbutrin 150 mg sent to pharmacy     Wellbutrin 150 mg prescribed Potential side effects were discussed. Pt should take one tab daily for a week and if she does not have any side effects she can take BID. If this does not work for her she may need auto immune testing to see if she has Lupus or other autoimmune disorders. Total Time: minutes: 11-20 minutes. This plan was reviewed with the patient and patient agrees. All questions were answered. This scribe documentation was reviewed by me and accurately reflects the examination and decisions made by me. This note will not be viewable in [address]

## 2020-04-22 NOTE — TELEPHONE ENCOUNTER
From: Himanshu Sears  To: Macie Mccallum MD  Sent: 4/22/2020 11:35 AM EDT  Subject: Prescription Question    You called in levothyroxine and I take Synthroid and want to stay on that. I spoke to the pharmacy and they said my new ins requires auth. Can you please try to get the auth. I am level with the synthroid. If this is a problem I can just keep using the [facility] that does not go through insurance.

## 2020-04-22 NOTE — PROGRESS NOTES
Spoke with pharmacy after being unable to reach patient. Issue is patient cannot take the generic form of synthroid therefore requires a PA.   PA started through cover my meds  Key #[id number]

## 2020-04-27 RX ORDER — LEVOTHYROXINE SODIUM 150 UG/1
150 TABLET ORAL
Qty: 90 TAB | Refills: 0 | Status: SHIPPED | OUTPATIENT
Start: 2020-04-27 | End: 2020-08-03 | Stop reason: SDUPTHER

## 2020-08-03 ENCOUNTER — PATIENT MESSAGE (OUTPATIENT)
Dept: PRIMARY CARE CLINIC | Age: 40
End: 2020-08-03

## 2020-08-03 DIAGNOSIS — E03.9 ACQUIRED HYPOTHYROIDISM: ICD-10-CM

## 2020-08-03 RX ORDER — LEVOTHYROXINE SODIUM 150 UG/1
150 TABLET ORAL
Qty: 90 TAB | Refills: 0 | Status: SHIPPED | OUTPATIENT
Start: 2020-08-03 | End: 2020-11-17 | Stop reason: SDUPTHER

## 2020-08-03 NOTE — TELEPHONE ENCOUNTER
From: Doron Sears  To: Eveline Gold MD  Sent: 8/3/2020 11:43 AM EDT  Subject: Prescription Question    I am just checking to see if you have received a request to refill my 90 day supply of Synthroid 150mg from [facility]?

## 2020-09-08 ENCOUNTER — E-VISIT (OUTPATIENT)
Dept: PRIMARY CARE CLINIC | Age: 40
End: 2020-09-08

## 2020-09-08 DIAGNOSIS — N30.90 CYSTITIS: Primary | ICD-10-CM

## 2020-09-08 RX ORDER — CIPROFLOXACIN 500 MG/1
500 TABLET ORAL 2 TIMES DAILY
Qty: 14 TAB | Refills: 0 | Status: SHIPPED | OUTPATIENT
Start: 2020-09-08 | End: 2020-09-15

## 2020-09-09 NOTE — TELEPHONE ENCOUNTER
Patient has been having increase urinary frequency , burning sensation & pain on urination for almost 3  Days. She has been using OTC Azol  with no relief. She has increase her Hydration & drinking Cranberry juice more often but it has not help. Has history of UTI. Answers to all questionnaire reviewed. Allergies reviewed. PLAN:   Cystitis:  Ciprofloxacin sent to the pharmacy. If no improvement in next 2 days should make an appointment.

## 2020-11-16 ENCOUNTER — TELEPHONE (OUTPATIENT)
Dept: PRIMARY CARE CLINIC | Age: 40
End: 2020-11-16

## 2020-11-17 DIAGNOSIS — E03.9 ACQUIRED HYPOTHYROIDISM: ICD-10-CM

## 2020-11-17 RX ORDER — LEVOTHYROXINE SODIUM 150 UG/1
150 TABLET ORAL
Qty: 90 TAB | Refills: 2 | Status: SHIPPED | OUTPATIENT
Start: 2020-11-17 | End: 2021-03-02 | Stop reason: SDUPTHER

## 2020-11-17 NOTE — TELEPHONE ENCOUNTER
[place] [id number] has sent a request to refill my synthroid 150 as of 11.9.20 and I called yesterday and person whom answered took a message to get this taken care of with the fax number to send it. I get the 90day but you keep making it only one refill I'm assuming because its a 90 day and it messes me up everytime its time to refill. Can you please get my script over to [facility]  for the 90 with refills.      Last office visit 4/22/2020  Last med refill 8/3/2020

## 2021-03-02 DIAGNOSIS — E03.9 ACQUIRED HYPOTHYROIDISM: ICD-10-CM

## 2021-03-02 RX ORDER — LEVOTHYROXINE SODIUM 150 UG/1
150 TABLET ORAL
Qty: 90 TAB | Refills: 0 | Status: SHIPPED | OUTPATIENT
Start: 2021-03-02 | End: 2021-03-03 | Stop reason: SDUPTHER

## 2021-03-03 DIAGNOSIS — E03.9 ACQUIRED HYPOTHYROIDISM: ICD-10-CM

## 2021-03-03 RX ORDER — LEVOTHYROXINE SODIUM 150 UG/1
150 TABLET ORAL
Qty: 90 TAB | Refills: 0 | Status: CANCELLED | OUTPATIENT
Start: 2021-03-03 | End: 2021-06-01

## 2021-03-03 RX ORDER — LEVOTHYROXINE SODIUM 150 UG/1
150 TABLET ORAL
Qty: 90 TAB | Refills: 0 | Status: SHIPPED | OUTPATIENT
Start: 2021-03-03 | End: 2021-06-25 | Stop reason: SDUPTHER

## 2021-03-03 NOTE — TELEPHONE ENCOUNTER
I specifically requested this to NOT BE called into [facility] as I can't afford the refill through them right now. The message sent TWICE [facility] and I made sure that the pharmacy was in [place]. Can you please call in the levothyroxine to [facility] in [place] for the 150 mcgs? Also  I need to do a tele visit for something to help me with my anxiety, I have had bouts of overwhelming anxiety and right now is one of those times and the Wellbutrin does not help. I need something that is going to actually alleviate some of the anxiety that is being produced by my overthinking everything!         Just done but needs to go to the [facility]

## 2021-03-04 ENCOUNTER — TELEPHONE (OUTPATIENT)
Dept: PRIMARY CARE CLINIC | Age: 41
End: 2021-03-04

## 2021-03-04 ENCOUNTER — PATIENT MESSAGE (OUTPATIENT)
Dept: PRIMARY CARE CLINIC | Age: 41
End: 2021-03-04

## 2021-03-08 ENCOUNTER — VIRTUAL VISIT (OUTPATIENT)
Dept: PRIMARY CARE CLINIC | Age: 41
End: 2021-03-08
Payer: COMMERCIAL

## 2021-03-08 DIAGNOSIS — E66.9 OBESITY (BMI 30-39.9): ICD-10-CM

## 2021-03-08 DIAGNOSIS — E03.9 ACQUIRED HYPOTHYROIDISM: Primary | ICD-10-CM

## 2021-03-08 DIAGNOSIS — F43.9 STRESS AT HOME: ICD-10-CM

## 2021-03-08 DIAGNOSIS — F41.1 GAD (GENERALIZED ANXIETY DISORDER): ICD-10-CM

## 2021-03-08 PROCEDURE — 99214 OFFICE O/P EST MOD 30 MIN: CPT | Performed by: INTERNAL MEDICINE

## 2021-03-08 RX ORDER — BUSPIRONE HYDROCHLORIDE 10 MG/1
10 TABLET ORAL 2 TIMES DAILY
Qty: 60 TAB | Refills: 0 | Status: SHIPPED | OUTPATIENT
Start: 2021-03-08 | End: 2021-04-07

## 2021-03-08 NOTE — PROGRESS NOTES
Lisa Renee (: 1980) is a 47 y.o. female, established patient, here for evaluation of the following chief complaint(s): Anxiety     Written by Stephanie Hamm, as dictated by Dr. Gómez Travis MD.    ASSESSMENT/PLAN:  1. Acquired hypothyroidism  Stable, continues on levothyroxine 150 mcg every day. 2. XAVIER (generalized anxiety disorder)  -     busPIRone (BUSPAR) 10 mg tablet; Take 1 Tab by mouth two (2) times a day for 30 days. , Normal, Disp-60 Tab, R-0 sent to pharmacy. Potential side effects were discussed. I prescribed Buspar and instructed her to take it BID. Explained that this is a medication that can be taken PRN instead of daily, but with all the stress in her life she is going to need it BID. 3. Stress at home  I instructed her to keep taking Buspar consistently BID at least until her life becomes a bit less stressful. Will monitor for changes or improvements. 4. Obesity (BMI 30-39. 9)  I commended the pt on their healthy lifestyle choices and routines. Explained that they should be walking 5,000 steps daily to maintain conditioning and weight and increase to at least 10,000 steps to work on losing weight. SUBJECTIVE/OBJECTIVE:  HPI  Pt presents virtually today to discuss anxiety. She has a lot of stress in her life because she is managing many different aspects of her life including her father's health, her young children in school, her own job stress, and trying to buy a home. She is feeling constantly agitated and completely unable to focus on one thing at a time. Instead of thinking about one thing at a time, she is usually worrying about everything all at once. She has never taken an anxiety medication before, only Wellbutrin at one point which she thinks she took for weight loss.      Patient Active Problem List   Diagnosis Code    Acquired hypothyroidism E03.9        Current Outpatient Medications on File Prior to Visit   Medication Sig Dispense Refill  levothyroxine (SYNTHROID) 150 mcg tablet Take 1 Tab by mouth Daily (before breakfast) for 90 days. 90 Tab 0    omeprazole (PRILOSEC) 40 mg capsule Take 1 Cap by mouth daily. 90 Cap 0    cholecalciferol (VITAMIN D3) 1,000 unit cap Take 4,000 Units by mouth daily.  b complex vitamins tablet Take 2 Tabs by mouth daily.  PRENATAL VITS W-CA,FE,FA,<1MG, (PRENATAL VITAMIN PO) Take 1 Cap by mouth daily. No current facility-administered medications on file prior to visit.         Allergies   Allergen Reactions    Amoxicillin Anaphylaxis    Ampicillin Anaphylaxis    Penicillins Anaphylaxis       Past Medical History:   Diagnosis Date    Diabetes ([facility] [address]     gest DM- diet controlled    Thyroid activity decreased     hypo       Past Surgical History:   Procedure Laterality Date     DELIVERY ONLY  2004    emergency    HX CHOLECYSTECTOMY      HX OTHER SURGICAL  10/2004    lap choly    HX TUBAL LIGATION  2016       Family History   Problem Relation Age of Onset    Diabetes Maternal Grandmother     Stroke Maternal Grandmother     Dementia Maternal Grandfather     Alcohol abuse Neg Hx     Arthritis-osteo Neg Hx     Asthma Neg Hx     Bleeding Prob Neg Hx     Cancer Neg Hx     Elevated Lipids Neg Hx     Headache Neg Hx     Heart Disease Neg Hx     Hypertension Neg Hx     Lung Disease Neg Hx     Psychiatric Disorder Neg Hx     Migraines Neg Hx     Mental Retardation Neg Hx        Social History     Socioeconomic History    Marital status:      Spouse name: Not on file    Number of children: Not on file    Years of education: Not on file    Highest education level: Not on file   Occupational History    Not on file   Social Needs    Financial resource strain: Not on file    Food insecurity     Worry: Not on file     Inability: Not on file    Transportation needs     Medical: Not on file     Non-medical: Not on file   Tobacco Use    Smoking status: Former Smoker     Years: 15.00     Start date: 3/5/2018     Quit date: 3/5/2018     Years since quitting: 3.0   • Smokeless tobacco: Never Used   Substance and Sexual Activity   • Alcohol use: Yes     Comment: SOCIALLY ONCE A MONTH   • Drug use: No   • Sexual activity: Yes     Partners: Male   Lifestyle   • Physical activity     Days per week: Not on file     Minutes per session: Not on file   • Stress: Not on file   Relationships   • Social connections     Talks on phone: Not on file     Gets together: Not on file     Attends [employer] service: Not on file     Active member of club or organization: Not on file     Attends meetings of clubs or organizations: Not on file     Relationship status: Not on file   • Intimate partner violence     Fear of current or ex partner: Not on file     Emotionally abused: Not on file     Physically abused: Not on file     Forced sexual activity: Not on file   Other Topics Concern   • Not on file   Social History Narrative   • Not on file       No visits with results within 3 Month(s) from this visit.   Latest known visit with results is:   Orders Only on 04/16/2020   Component Date Value Ref Range Status   • Glucose 04/16/2020 108* 65 - 99 mg/dL Final   • BUN 04/16/2020 14  6 - 20 mg/dL Final   • Creatinine 04/16/2020 0.75  0.57 - 1.00 mg/dL Final   • GFR est non-AA 04/16/2020 102  >59 mL/min/1.73 Final   • GFR est AA 04/16/2020 118  >59 mL/min/1.73 Final   • BUN/Creatinine ratio 04/16/2020 19  9 - 23 Final   • Sodium 04/16/2020 137  134 - 144 mmol/L Final   • Potassium 04/16/2020 4.7  3.5 - 5.2 mmol/L Final   • Chloride 04/16/2020 100  96 - 106 mmol/L Final   • CO2 04/16/2020 22  20 - 29 mmol/L Final   • Calcium 04/16/2020 9.3  8.7 - 10.2 mg/dL Final   • Protein, total 04/16/2020 6.9  6.0 - 8.5 g/dL Final   • Albumin 04/16/2020 4.5  3.8 - 4.8 g/dL Final   • GLOBULIN, TOTAL 04/16/2020 2.4  1.5 - 4.5 g/dL Final   • A-G Ratio 04/16/2020 1.9  1.2 - 2.2 Final   • Bilirubin, total 04/16/2020  0.3  0.0 - 1.2 mg/dL Final    Alk. phosphatase 04/16/2020 65  39 - 117 IU/L Final    AST (SGOT) 04/16/2020 17  0 - 40 IU/L Final    ALT (SGPT) 04/16/2020 26  0 - 32 IU/L Final    TSH 04/16/2020 1.000  0.450 - 4.500 uIU/mL Final    WBC 04/16/2020 7.9  3.4 - 10.8 x10E3/uL Final    RBC 04/16/2020 4.71  3.77 - 5.28 x10E6/uL Final    HGB 04/16/2020 14.2  11.1 - 15.9 g/dL Final    HCT 04/16/2020 42.2  34.0 - 46.6 % Final    MCV 04/16/2020 90  79 - 97 fL Final    MCH 04/16/2020 30.1  26.6 - 33.0 pg Final    MCHC 04/16/2020 33.6  31.5 - 35.7 g/dL Final    RDW 04/16/2020 12.6  11.7 - 15.4 % Final    PLATELET 57/71/9985 740  150 - 450 x10E3/uL Final     Review of Systems   Constitutional: Negative for activity change, fatigue and unexpected weight change. HENT: Negative for congestion, hearing loss, rhinorrhea and sore throat. Eyes: Negative for discharge. Respiratory: Negative for cough, chest tightness and shortness of breath. Gastrointestinal: Negative for abdominal pain, constipation and diarrhea. Genitourinary: Negative for dysuria, flank pain, frequency and urgency. Musculoskeletal: Negative for arthralgias, back pain and myalgias. Skin: Negative for color change and rash. Neurological: Negative for dizziness, light-headedness and headaches. Psychiatric/Behavioral: Positive for agitation (irritability). Negative for dysphoric mood and sleep disturbance. The patient is nervous/anxious. No flowsheet data found.     Physical Exam    [INSTRUCTIONS:  \"[x]\" Indicates a positive item  \"[]\" Indicates a negative item  -- DELETE ALL ITEMS NOT EXAMINED]    Constitutional: [x] Appears well-developed and well-nourished [x] No apparent distress      [] Abnormal -     Mental status: [x] Alert and awake  [x] Oriented to person/place/time [x] Able to follow commands    [] Abnormal -     Eyes:   EOM    [x]  Normal    [] Abnormal -   Sclera  [x]  Normal    [] Abnormal -          Discharge [x]  None visible   [] Abnormal -     HENT: [x] Normocephalic, atraumatic  [] Abnormal -   [x] Mouth/Throat: Mucous membranes are moist    External Ears [x] Normal  [] Abnormal -    Neck: [x] No visualized mass [] Abnormal -     Pulmonary/Chest: [x] Respiratory effort normal   [x] No visualized signs of difficulty breathing or respiratory distress        [] Abnormal -      Musculoskeletal:   [x] Normal gait with no signs of ataxia         [x] Normal range of motion of neck        [] Abnormal -     Neurological:        [x] No Facial Asymmetry (Cranial nerve 7 motor function) (limited exam due to video visit)          [x] No gaze palsy        [] Abnormal -          Skin:        [x] No significant exanthematous lesions or discoloration noted on facial skin         [] Abnormal -            Psychiatric:       [x] Normal Affect [] Abnormal -        [x] No Hallucinations    Other pertinent observable physical exam findings:-    Marah Abbott, was evaluated through a synchronous (real-time) audio-video encounter. The patient (or guardian if applicable) is aware that this is a billable service. Verbal consent to proceed has been obtained within the past 12 months. The visit was conducted pursuant to the emergency declaration under the [address] [place] [address] authority and the [employer] Act. Patient identification was verified, and a caregiver was present when appropriate. The patient was located in a state where the provider was credentialed to provide care. An electronic signature was used to authenticate this note.   -- Abril Randle

## 2021-06-25 ENCOUNTER — PATIENT MESSAGE (OUTPATIENT)
Dept: PRIMARY CARE CLINIC | Age: 41
End: 2021-06-25

## 2021-06-25 DIAGNOSIS — E03.9 ACQUIRED HYPOTHYROIDISM: ICD-10-CM

## 2021-06-25 RX ORDER — LEVOTHYROXINE SODIUM 150 UG/1
150 TABLET ORAL
Qty: 90 TABLET | Refills: 0 | Status: SHIPPED | OUTPATIENT
Start: 2021-06-25 | End: 2021-09-23

## 2021-06-25 NOTE — TELEPHONE ENCOUNTER
From: Rj Sears  To: Ravindra Manuel MD  Sent: 6/25/2021 7:23 AM EDT  Subject: Prescription Question    I am in need of a refill on my levothyroxine today, through the [address] at [address] I can't request it through my chart, thank you!

## 2021-10-07 DIAGNOSIS — E03.9 ACQUIRED HYPOTHYROIDISM: ICD-10-CM

## 2021-10-07 DIAGNOSIS — Z11.59 ENCOUNTER FOR HEPATITIS C SCREENING TEST FOR LOW RISK PATIENT: Primary | ICD-10-CM

## 2021-10-07 DIAGNOSIS — E11.9 CONTROLLED TYPE 2 DIABETES MELLITUS WITHOUT COMPLICATION, WITHOUT LONG-TERM CURRENT USE OF INSULIN (HCC): ICD-10-CM

## 2021-10-07 DIAGNOSIS — I10 ESSENTIAL HYPERTENSION: ICD-10-CM

## 2021-10-07 DIAGNOSIS — E78.2 MIXED HYPERLIPIDEMIA: ICD-10-CM

## 2021-10-07 RX ORDER — LEVOTHYROXINE SODIUM 150 UG/1
TABLET ORAL
Qty: 90 TABLET | Refills: 0 | Status: SHIPPED | OUTPATIENT
Start: 2021-10-07 | End: 2022-01-19 | Stop reason: SDUPTHER

## 2021-10-13 LAB
ALBUMIN SERPL-MCNC: 4.5 G/DL (ref 3.8–4.8)
ALBUMIN/GLOB SERPL: 1.5 {RATIO} (ref 1.2–2.2)
ALP SERPL-CCNC: 93 IU/L (ref 44–121)
ALT SERPL-CCNC: 23 IU/L (ref 0–32)
AST SERPL-CCNC: 19 IU/L (ref 0–40)
BASOPHILS # BLD AUTO: 0 X10E3/UL (ref 0–0.2)
BASOPHILS NFR BLD AUTO: 0 %
BILIRUB SERPL-MCNC: <0.2 MG/DL (ref 0–1.2)
BUN SERPL-MCNC: 11 MG/DL (ref 6–20)
BUN/CREAT SERPL: 16 (ref 9–23)
CALCIUM SERPL-MCNC: 9.4 MG/DL (ref 8.7–10.2)
CHLORIDE SERPL-SCNC: 100 MMOL/L (ref 96–106)
CHOLEST SERPL-MCNC: 202 MG/DL (ref 100–199)
CO2 SERPL-SCNC: 25 MMOL/L (ref 20–29)
CREAT SERPL-MCNC: 0.69 MG/DL (ref 0.57–1)
EOSINOPHIL # BLD AUTO: 0.2 X10E3/UL (ref 0–0.4)
EOSINOPHIL NFR BLD AUTO: 2 %
ERYTHROCYTE [DISTWIDTH] IN BLOOD BY AUTOMATED COUNT: 12.5 % (ref 11.7–15.4)
GLOBULIN SER CALC-MCNC: 3.1 G/DL (ref 1.5–4.5)
GLUCOSE SERPL-MCNC: 109 MG/DL (ref 65–99)
HCT VFR BLD AUTO: 42.4 % (ref 34–46.6)
HCV AB S/CO SERPL IA: <0.1 S/CO RATIO (ref 0–0.9)
HDLC SERPL-MCNC: 31 MG/DL
HGB BLD-MCNC: 14.2 G/DL (ref 11.1–15.9)
IMM GRANULOCYTES # BLD AUTO: 0 X10E3/UL (ref 0–0.1)
IMM GRANULOCYTES NFR BLD AUTO: 0 %
LDLC SERPL CALC-MCNC: 91 MG/DL (ref 0–99)
LYMPHOCYTES # BLD AUTO: 3.1 X10E3/UL (ref 0.7–3.1)
LYMPHOCYTES NFR BLD AUTO: 32 %
MCH RBC QN AUTO: 29.8 PG (ref 26.6–33)
MCHC RBC AUTO-ENTMCNC: 33.5 G/DL (ref 31.5–35.7)
MCV RBC AUTO: 89 FL (ref 79–97)
MONOCYTES # BLD AUTO: 0.6 X10E3/UL (ref 0.1–0.9)
MONOCYTES NFR BLD AUTO: 6 %
NEUTROPHILS # BLD AUTO: 5.7 X10E3/UL (ref 1.4–7)
NEUTROPHILS NFR BLD AUTO: 60 %
PLATELET # BLD AUTO: 425 X10E3/UL (ref 150–450)
POTASSIUM SERPL-SCNC: 4.6 MMOL/L (ref 3.5–5.2)
PROT SERPL-MCNC: 7.6 G/DL (ref 6–8.5)
RBC # BLD AUTO: 4.77 X10E6/UL (ref 3.77–5.28)
SODIUM SERPL-SCNC: 139 MMOL/L (ref 134–144)
TRIGL SERPL-MCNC: 483 MG/DL (ref 0–149)
TSH SERPL DL<=0.005 MIU/L-ACNC: 3.45 UIU/ML (ref 0.45–4.5)
VLDLC SERPL CALC-MCNC: 80 MG/DL (ref 5–40)
WBC # BLD AUTO: 9.6 X10E3/UL (ref 3.4–10.8)

## 2021-10-15 ENCOUNTER — OFFICE VISIT (OUTPATIENT)
Dept: PRIMARY CARE CLINIC | Age: 41
End: 2021-10-15
Payer: COMMERCIAL

## 2021-10-15 VITALS
RESPIRATION RATE: 15 BRPM | BODY MASS INDEX: 36.23 KG/M2 | HEART RATE: 83 BPM | DIASTOLIC BLOOD PRESSURE: 81 MMHG | TEMPERATURE: 98.4 F | SYSTOLIC BLOOD PRESSURE: 121 MMHG | OXYGEN SATURATION: 98 % | WEIGHT: 212.2 LBS | HEIGHT: 64 IN

## 2021-10-15 DIAGNOSIS — E03.9 ACQUIRED HYPOTHYROIDISM: Primary | ICD-10-CM

## 2021-10-15 DIAGNOSIS — E66.01 SEVERE OBESITY (HCC): ICD-10-CM

## 2021-10-15 DIAGNOSIS — E78.1 HYPERTRIGLYCERIDEMIA: ICD-10-CM

## 2021-10-15 PROCEDURE — 99214 OFFICE O/P EST MOD 30 MIN: CPT | Performed by: INTERNAL MEDICINE

## 2021-10-15 NOTE — PROGRESS NOTES
Chief Complaint   Patient presents with    Medication Refill    Follow-up     lab follow up       Visit Vitals  /81 (BP 1 Location: Left arm)   Pulse 83   Temp 98.4 °F (36.9 °C)   Resp 15   Ht 5' 4\" (1.626 m)   Wt 212 lb 3.2 oz (96.3 kg)   LMP 10/02/2021   SpO2 98%   BMI 36.42 kg/m²   patient declined flu shot    1. Have you been to the ER, urgent care clinic since your last visit? Hospitalized since your last visit? No    2. Have you seen or consulted any other health care providers outside of the [address] [place] [address] since your last visit? Include any pap smears or colon screening.  Yes GYN- in February

## 2021-10-15 NOTE — PROGRESS NOTES
Romario Tran (: 1980) is a 47 y.o. female, established patient, here for evaluation of the following chief complaint(s):  Medication Refill and Follow-up (lab follow up)     Written by Ceasar Mueller, as dictated by Dr. Ronnie Leonard MD.      ASSESSMENT/PLAN:  Below is the assessment and plan developed based on review of pertinent history, physical exam, labs, studies, and medications. 1. Acquired hypothyroidism  Well controlled on current medication. Continue taking Synthroid 150 mcg as prescribed. 2. Severe obesity ([address]  Advised pt to purchase a FitBit or similar device. Discussed that a healthy lifestyle requires 5,000-7,000 steps daily, but weight loss requires 10,000 steps daily. I recommend intermittent walking 3x daily for 10-15 minutes after meals. 3. Hypertriglyceridemia  I recommend intermittent walking 3x daily for 10-15 minutes after meals. Advised her to start exercises regularly. Will recheck labs in January. SUBJECTIVE/OBJECTIVE:  HPI     Patient presents today for a follow up visit. Her most recent labs from 10/12 revealed elevated total cholesterol (202), elevated triglyceride (483), elevated VLDL (80), and low HDL (31). She notes she was not fasting for these labs. Her weight has changed from 198 lbs on 2019 to 212 lbs today. Her blood glucose was elevated (109). She notes she was not fasting for these labs. She takes Synthroid 150 mcg for hypothyroidism. Her TSH was 3.45. She c/o not being able to remember things as well as in the past. She has a fhx of dementia (grandfather). She does not think she snores at night. She is followed by Dr. Rachel Carrion. She completed a Pap smear this year and results were unremarkable. She has not received a COVID-19 vaccine and she does not plan on receiving one.      Patient Active Problem List   Diagnosis Code    Acquired hypothyroidism E03.9    Hypertriglyceridemia E78.1        Current Outpatient Medications on File Prior to Visit   Medication Sig Dispense Refill    levothyroxine (SYNTHROID) 150 mcg tablet TAKE 1 TABLET BY MOUTH ONCE DAILY BEFORE BREAKFAST FOR 90 DAYS 90 Tablet 0    [DISCONTINUED] omeprazole (PRILOSEC) 40 mg capsule Take 1 Cap by mouth daily. 90 Cap 0    cholecalciferol (VITAMIN D3) 1,000 unit cap Take 4,000 Units by mouth daily.  b complex vitamins tablet Take 2 Tabs by mouth daily.  PRENATAL VITS W-CA,FE,FA,<1MG, (PRENATAL VITAMIN PO) Take 1 Cap by mouth daily. No current facility-administered medications on file prior to visit.        Allergies   Allergen Reactions    Amoxicillin Anaphylaxis    Ampicillin Anaphylaxis    Penicillins Anaphylaxis       Past Medical History:   Diagnosis Date    Diabetes ([facility] [address]     gest DM- diet controlled    Thyroid activity decreased     hypo       Past Surgical History:   Procedure Laterality Date    HX CHOLECYSTECTOMY      HX OTHER SURGICAL  10/2004    lap choly    HX TUBAL LIGATION      [place]  DELIVERY ONLY  2004    emergency       Family History   Problem Relation Age of Onset    Diabetes Maternal Grandmother     Stroke Maternal Grandmother     Dementia Maternal Grandfather     Alcohol abuse Neg Hx     Arthritis-osteo Neg Hx     Asthma Neg Hx     Bleeding Prob Neg Hx     Cancer Neg Hx     Elevated Lipids Neg Hx     Headache Neg Hx     Heart Disease Neg Hx     Hypertension Neg Hx     Lung Disease Neg Hx     Psychiatric Disorder Neg Hx     Migraines Neg Hx     Mental Retardation Neg Hx        Social History     Socioeconomic History    Marital status:      Spouse name: Not on file    Number of children: Not on file    Years of education: Not on file    Highest education level: Not on file   Occupational History    Not on file   Tobacco Use    Smoking status: Former Smoker     Years: 15.00     Start date: 3/5/2018     Quit date: 3/5/2018     Years since quitting: 3.6    Smokeless tobacco: Never Used   Vaping Use    Vaping Use: Never used   Substance and Sexual Activity    Alcohol use: Yes     Comment: SOCIALLY ONCE A MONTH    Drug use: No    Sexual activity: Yes     Partners: Male   Other Topics Concern    Not on file   Social History Narrative    Not on file     Social Determinants of Health     Financial Resource Strain:     Difficulty of Paying Living Expenses:    Food Insecurity:     Worried About Running Out of Food in the Last Year:     [address] in the Last Year:    Transportation Needs:     Lack of Transportation (Medical):  Lack of Transportation (Non-Medical):    Physical Activity:     Days of Exercise per Week:     Minutes of Exercise per Session:    Stress:     Feeling of Stress :    Social Connections:     Frequency of Communication with Friends and Family:     Frequency of Social Gatherings with Friends and Family:     Attends Alevism Services:     Active Member of Clubs or Organizations:     Attends Club or Organization Meetings:     Marital Status:    Intimate Partner Violence:     Fear of Current or Ex-Partner:     Emotionally Abused:     Physically Abused:     Sexually Abused:        Orders Only on 10/07/2021   Component Date Value Ref Range Status    WBC 10/12/2021 9.6  3.4 - 10.8 x10E3/uL Final    RBC 10/12/2021 4.77  3.77 - 5.28 x10E6/uL Final    HGB 10/12/2021 14.2  11.1 - 15.9 g/dL Final    HCT 10/12/2021 42.4  34.0 - 46.6 % Final    MCV 10/12/2021 89  79 - 97 fL Final    MCH 10/12/2021 29.8  26.6 - 33.0 pg Final    MCHC 10/12/2021 33.5  31.5 - 35.7 g/dL Final    RDW 10/12/2021 12.5  11.7 - 15.4 % Final    PLATELET 46/21/2674 658  150 - 450 x10E3/uL Final    NEUTROPHILS 10/12/2021 60  Not Estab. % Final    Lymphocytes 10/12/2021 32  Not Estab. % Final    MONOCYTES 10/12/2021 6  Not Estab. % Final    EOSINOPHILS 10/12/2021 2  Not Estab. % Final    BASOPHILS 10/12/2021 0  Not Estab. % Final    ABS.  NEUTROPHILS 10/12/2021 5.7  1.4 - 7.0 x10E3/uL Final    Abs Lymphocytes 10/12/2021 3.1  0.7 - 3.1 x10E3/uL Final    ABS. MONOCYTES 10/12/2021 0.6  0.1 - 0.9 x10E3/uL Final    ABS. EOSINOPHILS 10/12/2021 0.2  0.0 - 0.4 x10E3/uL Final    ABS. BASOPHILS 10/12/2021 0.0  0.0 - 0.2 x10E3/uL Final    IMMATURE GRANULOCYTES 10/12/2021 0  Not Estab. % Final    ABS. IMM. GRANS. 10/12/2021 0.0  0.0 - 0.1 x10E3/uL Final    Glucose 10/12/2021 109* 65 - 99 mg/dL Final    BUN 10/12/2021 11  6 - 20 mg/dL Final    Creatinine 10/12/2021 0.69  0.57 - 1.00 mg/dL Final    GFR est non-AA 10/12/2021 111  >59 mL/min/1.73 Final    GFR est AA 10/12/2021 128  >59 mL/min/1.73 Final    Comment: **[facility] currently reports eGFR in compliance with the current**    recommendations of the [employer]. Ronel Mancillas will    update reporting as new guidelines are published from the NKF-ASN    Task force.  BUN/Creatinine ratio 10/12/2021 16  9 - 23 Final    Sodium 10/12/2021 139  134 - 144 mmol/L Final    Potassium 10/12/2021 4.6  3.5 - 5.2 mmol/L Final    Chloride 10/12/2021 100  96 - 106 mmol/L Final    CO2 10/12/2021 25  20 - 29 mmol/L Final    Calcium 10/12/2021 9.4  8.7 - 10.2 mg/dL Final    Protein, total 10/12/2021 7.6  6.0 - 8.5 g/dL Final    Albumin 10/12/2021 4.5  3.8 - 4.8 g/dL Final    GLOBULIN, TOTAL 10/12/2021 3.1  1.5 - 4.5 g/dL Final    A-G Ratio 10/12/2021 1.5  1.2 - 2.2 Final    Bilirubin, total 10/12/2021 <0.2  0.0 - 1.2 mg/dL Final    Alk.  phosphatase 10/12/2021 93  44 - 121 IU/L Final                  **Please note reference interval change**    AST (SGOT) 10/12/2021 19  0 - 40 IU/L Final    ALT (SGPT) 10/12/2021 23  0 - 32 IU/L Final    TSH 10/12/2021 3.450  0.450 - 4.500 uIU/mL Final    Cholesterol, total 10/12/2021 202* 100 - 199 mg/dL Final    Triglyceride 10/12/2021 483* 0 - 149 mg/dL Final    HDL Cholesterol 10/12/2021 31* >39 mg/dL Final    VLDL, calculated 10/12/2021 80* 5 - 40 mg/dL Final    LDL, calculated 10/12/2021 91  0 - 99 mg/dL Final    Hep C Virus Ab 10/12/2021 <0.1  0.0 - 0.9 s/co ratio Final    Comment:                                   Negative:     < 0.8                               Indeterminate: 0.8 - 0.9                                    Positive:     > 0.9   The CDC recommends that a positive HCV antibody result   be followed up with a HCV Nucleic Acid Amplification   test ([id number]). Review of Systems   Constitutional: Negative for activity change, fatigue and unexpected weight change. HENT: Negative for congestion, hearing loss, rhinorrhea and sore throat. Eyes: Negative for discharge. Respiratory: Negative for cough, chest tightness and shortness of breath. Cardiovascular: Negative for leg swelling. Gastrointestinal: Negative for abdominal pain, constipation and diarrhea. Genitourinary: Negative for dysuria, flank pain, frequency and urgency. Musculoskeletal: Negative for arthralgias, back pain and myalgias. Skin: Negative for color change and rash. Neurological: Negative for dizziness, light-headedness and headaches. Psychiatric/Behavioral: Positive for confusion. Negative for dysphoric mood and sleep disturbance. The patient is not nervous/anxious. Visit Vitals  /81 (BP 1 Location: Left arm)   Pulse 83   Temp 98.4 °F (36.9 °C)   Resp 15   Ht 5' 4\" (1.626 m)   Wt 212 lb 3.2 oz (96.3 kg)   LMP 10/02/2021   SpO2 98%   BMI 36.42 kg/m²       Physical Exam  Vitals and nursing note reviewed. Constitutional:       General: She is not in acute distress. Appearance: Normal appearance. She is well-developed. She is not diaphoretic. HENT:      Right Ear: External ear normal.      Left Ear: External ear normal.   Eyes:      General: No scleral icterus. Right eye: No discharge. Left eye: No discharge. Extraocular Movements: Extraocular movements intact.       Conjunctiva/sclera: Conjunctivae normal.   Cardiovascular:      Rate and Rhythm: Normal rate and regular rhythm. Pulmonary:      Effort: Pulmonary effort is normal.      Breath sounds: Normal breath sounds. No wheezing. Abdominal:      General: Bowel sounds are normal.      Palpations: Abdomen is soft. Tenderness: There is no abdominal tenderness. Musculoskeletal:      Cervical back: Normal range of motion and neck supple. Lymphadenopathy:      Cervical: No cervical adenopathy. Neurological:      Mental Status: She is alert and oriented to person, place, and time. Psychiatric:         Mood and Affect: Mood and affect normal.             An electronic signature was used to authenticate this note.   -- Merlyn Hu

## 2022-01-19 DIAGNOSIS — E03.9 ACQUIRED HYPOTHYROIDISM: ICD-10-CM

## 2022-01-19 RX ORDER — LEVOTHYROXINE SODIUM 150 UG/1
150 TABLET ORAL
Qty: 90 TABLET | Refills: 0 | Status: SHIPPED | OUTPATIENT
Start: 2022-01-19

## 2022-01-19 NOTE — TELEPHONE ENCOUNTER
Requested Prescriptions     Pending Prescriptions Disp Refills    levothyroxine (SYNTHROID) 150 mcg tablet 90 Tablet 0     Sig: Take 1 Tablet by mouth Daily (before breakfast).         Last Visit 10/15/21  Last Refill 10/7/21

## 2022-03-18 PROBLEM — E78.1 HYPERTRIGLYCERIDEMIA: Status: ACTIVE | Noted: 2021-10-15

## 2022-03-19 PROBLEM — E03.9 ACQUIRED HYPOTHYROIDISM: Status: ACTIVE | Noted: 2019-09-09

## 2024-04-10 ENCOUNTER — OFFICE VISIT (OUTPATIENT)
Facility: CLINIC | Age: 44
End: 2024-04-10
Payer: COMMERCIAL

## 2024-04-10 VITALS
HEART RATE: 78 BPM | RESPIRATION RATE: 16 BRPM | DIASTOLIC BLOOD PRESSURE: 84 MMHG | TEMPERATURE: 98.2 F | WEIGHT: 189.4 LBS | HEIGHT: 63 IN | BODY MASS INDEX: 33.56 KG/M2 | SYSTOLIC BLOOD PRESSURE: 130 MMHG | OXYGEN SATURATION: 97 %

## 2024-04-10 DIAGNOSIS — E03.9 ACQUIRED HYPOTHYROIDISM: ICD-10-CM

## 2024-04-10 DIAGNOSIS — E78.1 HYPERTRIGLYCERIDEMIA: ICD-10-CM

## 2024-04-10 DIAGNOSIS — F17.210 CIGARETTE SMOKER: ICD-10-CM

## 2024-04-10 DIAGNOSIS — Z00.00 PHYSICAL EXAM: ICD-10-CM

## 2024-04-10 DIAGNOSIS — Z76.89 ENCOUNTER TO ESTABLISH CARE: Primary | ICD-10-CM

## 2024-04-10 DIAGNOSIS — Z86.32 HISTORY OF GESTATIONAL DIABETES: ICD-10-CM

## 2024-04-10 PROCEDURE — 99204 OFFICE O/P NEW MOD 45 MIN: CPT | Performed by: PHYSICIAN ASSISTANT

## 2024-04-10 RX ORDER — LEVOTHYROXINE SODIUM 150 UG/1
150 TABLET ORAL DAILY
COMMUNITY

## 2024-04-10 SDOH — ECONOMIC STABILITY: FOOD INSECURITY: WITHIN THE PAST 12 MONTHS, YOU WORRIED THAT YOUR FOOD WOULD RUN OUT BEFORE YOU GOT MONEY TO BUY MORE.: NEVER TRUE

## 2024-04-10 SDOH — ECONOMIC STABILITY: HOUSING INSECURITY
IN THE LAST 12 MONTHS, WAS THERE A TIME WHEN YOU DID NOT HAVE A STEADY PLACE TO SLEEP OR SLEPT IN A SHELTER (INCLUDING NOW)?: NO

## 2024-04-10 SDOH — ECONOMIC STABILITY: FOOD INSECURITY: WITHIN THE PAST 12 MONTHS, THE FOOD YOU BOUGHT JUST DIDN'T LAST AND YOU DIDN'T HAVE MONEY TO GET MORE.: NEVER TRUE

## 2024-04-10 SDOH — ECONOMIC STABILITY: INCOME INSECURITY: HOW HARD IS IT FOR YOU TO PAY FOR THE VERY BASICS LIKE FOOD, HOUSING, MEDICAL CARE, AND HEATING?: NOT HARD AT ALL

## 2024-04-10 ASSESSMENT — ENCOUNTER SYMPTOMS
CONSTIPATION: 0
BLOOD IN STOOL: 0
RESPIRATORY NEGATIVE: 1
EYES NEGATIVE: 1
ABDOMINAL PAIN: 0
SHORTNESS OF BREATH: 0
NAUSEA: 0
DIARRHEA: 0
VOMITING: 0

## 2024-04-10 ASSESSMENT — PATIENT HEALTH QUESTIONNAIRE - PHQ9
SUM OF ALL RESPONSES TO PHQ QUESTIONS 1-9: 0
1. LITTLE INTEREST OR PLEASURE IN DOING THINGS: NOT AT ALL
SUM OF ALL RESPONSES TO PHQ QUESTIONS 1-9: 0
SUM OF ALL RESPONSES TO PHQ9 QUESTIONS 1 & 2: 0
SUM OF ALL RESPONSES TO PHQ QUESTIONS 1-9: 0
SUM OF ALL RESPONSES TO PHQ QUESTIONS 1-9: 0
2. FEELING DOWN, DEPRESSED OR HOPELESS: NOT AT ALL

## 2024-04-10 NOTE — PROGRESS NOTES
Chepe Jade is a 43 y.o. year old female seen in clinic today for   Chief Complaint   Patient presents with    New Patient     Room 4A // previous pcp - md kayla        she is here today to establish care.  Former patient of kayla    Past Medical History significant for :   Uses Levoxyl 150 mcg --suspected hashimoto's. Has had 10 years   Seasonal allergies  Hx of gestational diabetes     Past Surgical History significant for 3 c-sections, cholecystectomy , tubal ligation    Family history significant for :  Father: dont know biological  Mother: Hypothyroid  Brother: Healthy    Diet : Intermittent fasting , doesn't eat until 11-11:30, regular, tries to avoid carbs late night, no fast food, trying to do whole food diet   No juice. Will do shot of grape juice. Some sweet tea at home.  Water, coffee, fountain drink   Exercise Routine : started exercising 2 weeks ago, pilates 3 x a week, has a standing desk, mostly sitting at work   Tobacco use : + 1 ppd  EtOH use : none  Sleep: No issues  Mental Health history : No major concerns    [facility]   Colonoscopy:  BREAST: Fibrocystic , needs SHAYAN done through GYN      she specifically denies any CP, SOB, HA. Dizziness, fevers, chills, N/V/D, urinary symptoms or other bowel changes.    Current Outpatient Medications on File Prior to Visit   Medication Sig Dispense Refill    LEVOXYL 150 MCG tablet Take 1 tablet by mouth daily      L-METHYLFOLATE-METHYLCOBALAMIN PO Take by mouth      Chlorpheniramine Maleate (ALLERGY RELIEF PO) Take by mouth       No current facility-administered medications on file prior to visit.         Allergies   Allergen Reactions    Penicillins Anaphylaxis     Past Medical History:   Diagnosis Date    Diabetes (HCC)     gest DM- diet controlled    Thyroid activity decreased     hypo      Past Surgical History:   Procedure Laterality Date     DELIVERY ONLY  2004    emergency x 3

## 2024-04-10 NOTE — PROGRESS NOTES
[facility]  Cardiology Consult Note  Patient: AMANDA Del Rio Date: 2023  : 1962 Attending: Curtis Olson MD  61 year old male     PCP: Chrystal Bliss MD     Consults  61-year-old  male with a family history of premature coronary disease, dyslipidemia presents now with exertional chest discomfort and dyspnea.    Assessment  1. Exertional chest, dyspnea-suspicious for angina.  Currently pain-free  2. History of dyslipidemia-on atorvastatin 10 mg/day  3. Family history of premature coronary disease.  4. Elevated blood pressure-no previous history of hypertension    Plan  1. Await troponin.  ECG without ischemic changes.  2. We will plan exercise echocardiogram to evaluate for myocardial ischemia if troponins are negative.    Low threshold for pursuing angiography with any abnormality.  Discussed with Dr. Olson in the emergency department    Roger Parham MD  [facility]  [facility]  __________________________________________________    History Of Present Illness  AMANDA Vora is a 61 year old male presenting with exertional dyspnea with some lightheadedness today.  Wish to see for advice and opinion with regard to his cardiac status.  The history is obtained from the patient who is reliable historian.  There are limited records available.    The patient is apparently had several months of exertional dyspnea.  He was to have had a stress test back in the spring but apparently there were insurance limitations and it did not occur.  He feels that he has had progressive decrease in his exercise ability limited with dyspnea but also noting some chest pressure with extended walking efforts.  There have not been any resting symptoms.    In the last couple of days, he has noted significant dyspnea with climbing stairs.  This morning he had more lightheadedness and some left-sided chest pressure up to the shoulder.  There is no associated nausea, vomiting, diaphoresis or  Chepe GLEN Jade  Identified pt with two pt identifiers(name and ).     Chief Complaint   Patient presents with    New Patient     Room 4A // previous pcp - md kayla        Reviewed record In preparation for visit and have obtained necessary documentation.     1. Have you been to the ER, urgent care clinic or hospitalized since your last visit? No     2. Have you seen or consulted any other health care providers outside of the [facility] since your last visit? Include any pap smears or colon screening. No    Patient does not have an advance directive.     Vitals reviewed with provider.    Health Maintenance reviewed:     Health Maintenance Due   Topic    Depression Screen     Cervical cancer screen     Diabetes screen           Wt Readings from Last 3 Encounters:   04/10/24 85.9 kg (189 lb 6.4 oz)   10/15/21 96.3 kg (212 lb 3.2 oz)        Temp Readings from Last 3 Encounters:   No data found for Temp        BP Readings from Last 3 Encounters:   10/15/21 121/81        Pulse Readings from Last 3 Encounters:   10/15/21 83             No data to display                  Learning Assessment:         4/10/2024     9:00 AM   [facility] AMB LEARNING ASSESSMENT   Primary Learner Patient   level of education SOME COLLEGE   Barriers Factors OTHER   Primary Language ENGLISH   Learning Preference DEMONSTRATION   Answered By Patient   Relationship to Learner SELF         Fall Risk Assessment:   :          No data to display                Abuse Screening:   :          No data to display                   ADL Screening:   :         4/10/2024     9:00 AM   ADL ASSESSMENT   Feeding yourself No Help Needed   Getting from bed to chair No Help Needed   Getting dressed No Help Needed   Bathing or showering No Help Needed   Walk across the room (includes cane/walker) No Help Needed   Using the telphone No Help Needed   Taking your medications No Help Needed   Preparing meals No Help Needed   Managing money  dyspnea.  He is presently pain-free.    There no history of myocardial infarction or congestive heart failure.  There has been no orthopnea, PND or lower extremity edema.  There have been no significant palpitations, presyncope or syncope.    There is no history of hypertension or diabetes.  He has been on atorvastatin 10 mg a day for a number of years.  He is not sure of his lipid levels.  He is a non-smoker.  There is a family history of premature coronary disease.    Past Medical History  Past Medical History:   Diagnosis Date   • High cholesterol         Surgical History  History reviewed. No pertinent surgical history.    Medications  Prior to Admission medications    Not on File        Allergies  ALLERGIES:  Patient has no known allergies.     Social History  Social History     Tobacco Use   • Smoking status: Never   • Smokeless tobacco: Never   Substance Use Topics   • Drug use: Not Currently   He drinks caffeine without limitation.  Other than coffee in the morning and tea in the evening.  He does not drink alcohol.    The patient is .  He has children who are alive and well ages 19-30.  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  He is fairly sedentary.  He feels that his weight is gone up over the last year or so.    Family History   Both parents had heart disease.  He has had 2 brothers who have had stents.     Review of Systems  Review of Systems   Constitutional: Negative.    HENT: Negative.    Eyes: Negative.    Respiratory: Positive for chest tightness and shortness of breath.    Cardiovascular: Negative for palpitations and leg swelling.   Gastrointestinal: Negative for abdominal distention and abdominal pain.   Endocrine: Negative.    Genitourinary: Negative.    Musculoskeletal: Negative.    Skin: Negative.    Allergic/Immunologic: Negative.    Neurological: Negative.    Hematological: Negative.    Psychiatric/Behavioral: Negative.              Vitals  Vitals:     08/25/23 1116 08/25/23 1311 08/25/23 1312 08/25/23 1325   BP: (!) 150/84 (!) 143/75     Patient Position: Sitting/High-Giles's      Pulse: 60  62    Resp: 19  12    Temp: 97.9 °F (36.6 °C)      TempSrc: Oral      SpO2: 99%  100%    Weight:    83.2 kg (183 lb 6.8 oz)   Height: 5' 5\" (1.651 m)        TELEMETRY: Personally reviewed-normal sinus rhythm    Physical Exam  Physical Exam  Vitals reviewed.   Constitutional:       General: He is not in acute distress.     Appearance: Normal appearance. He is not ill-appearing.   HENT:      Head: Normocephalic and atraumatic.      Mouth/Throat:      Mouth: Mucous membranes are moist.      Neck: No rigidity.   Eyes:      Extraocular Movements: Extraocular movements intact.      Conjunctiva/sclera: Conjunctivae normal.   Cardiovascular:      Rate and Rhythm: Normal rate and regular rhythm.      Pulses:           Carotid pulses are 2+ on the right side and 2+ on the left side.       Dorsalis pedis pulses are 2+ on the right side and 2+ on the left side.        Posterior tibial pulses are 2+ on the right side and 2+ on the left side.      Heart sounds: S1 normal and S2 normal.   Pulmonary:      Effort: Pulmonary effort is normal. No respiratory distress.      Breath sounds: Normal breath sounds. No rales.   Abdominal:      General: There is no distension.      Tenderness: There is no abdominal tenderness.   Musculoskeletal:         General: No swelling.      Right lower leg: No edema.      Left lower leg: No edema.   Skin:     General: Skin is warm and dry.   Neurological:      General: No focal deficit present.      Mental Status: He is alert and oriented to person, place, and time.   Psychiatric:         Mood and Affect: Mood normal.         Behavior: Behavior normal.       Labs:     Recent Labs     08/25/23  1325   WBC 7.9   HGB 14.9   HCT 44.0         Relevant Results  Encounter Date: 08/25/23   Electrocardiogram 12-Lead   Result Value    Ventricular Rate EKG/Min (BPM)  62    Atrial Rate (BPM) 62    MO-Interval (MSEC) 138    QRS-Interval (MSEC) 100    QT-Interval (MSEC) 408    QTc 414    P Axis (Degrees) 50    R Axis (Degrees) -40    T Axis (Degrees) 3    REPORT TEXT      Normal sinus rhythm  Left axis deviation  Abnormal ECG  No previous ECGs available     ECG personally reviewed-agree with above interpretation/SMR      No valid procedures specified.  XR CHEST PA OR AP 1 VIEW    (Results Pending)        The patient's chart has been reviewed for all previous cardiac testing.       There is no problem list on file for this patient.        Roger Parham MD  [facility]  8/25/2023

## 2024-04-11 LAB
25(OH)D3 SERPL-MCNC: <9 NG/ML (ref 30–100)
ALBUMIN SERPL-MCNC: 3.9 G/DL (ref 3.5–5)
ALBUMIN/GLOB SERPL: 1.2 (ref 1.1–2.2)
ALP SERPL-CCNC: 86 U/L (ref 45–117)
ALT SERPL-CCNC: 31 U/L (ref 12–78)
ANION GAP SERPL CALC-SCNC: 5 MMOL/L (ref 5–15)
APPEARANCE UR: CLEAR
AST SERPL-CCNC: 19 U/L (ref 15–37)
BACTERIA URNS QL MICRO: NEGATIVE /HPF
BASOPHILS # BLD: 0.1 K/UL (ref 0–0.1)
BASOPHILS NFR BLD: 0 % (ref 0–1)
BILIRUB SERPL-MCNC: 0.4 MG/DL (ref 0.2–1)
BILIRUB UR QL: NEGATIVE
BUN SERPL-MCNC: 9 MG/DL (ref 6–20)
BUN/CREAT SERPL: 13 (ref 12–20)
CALCIUM SERPL-MCNC: 9.6 MG/DL (ref 8.5–10.1)
CHLORIDE SERPL-SCNC: 107 MMOL/L (ref 97–108)
CHOLEST SERPL-MCNC: 200 MG/DL
CO2 SERPL-SCNC: 26 MMOL/L (ref 21–32)
COLOR UR: ABNORMAL
CREAT SERPL-MCNC: 0.72 MG/DL (ref 0.55–1.02)
DIFFERENTIAL METHOD BLD: ABNORMAL
EOSINOPHIL # BLD: 0.3 K/UL (ref 0–0.4)
EOSINOPHIL NFR BLD: 3 % (ref 0–7)
EPITH CASTS URNS QL MICRO: ABNORMAL /LPF
ERYTHROCYTE [DISTWIDTH] IN BLOOD BY AUTOMATED COUNT: 12.8 % (ref 11.5–14.5)
EST. AVERAGE GLUCOSE BLD GHB EST-MCNC: 111 MG/DL
GLOBULIN SER CALC-MCNC: 3.3 G/DL (ref 2–4)
GLUCOSE SERPL-MCNC: 111 MG/DL (ref 65–100)
GLUCOSE UR STRIP.AUTO-MCNC: NEGATIVE MG/DL
HBA1C MFR BLD: 5.5 % (ref 4–5.6)
HCT VFR BLD AUTO: 45.1 % (ref 35–47)
HDLC SERPL-MCNC: 41 MG/DL
HDLC SERPL: 4.9 (ref 0–5)
HGB BLD-MCNC: 14.6 G/DL (ref 11.5–16)
HGB UR QL STRIP: ABNORMAL
HYALINE CASTS URNS QL MICRO: ABNORMAL /LPF (ref 0–5)
IMM GRANULOCYTES # BLD AUTO: 0 K/UL (ref 0–0.04)
IMM GRANULOCYTES NFR BLD AUTO: 0 % (ref 0–0.5)
KETONES UR QL STRIP.AUTO: NEGATIVE MG/DL
LDLC SERPL CALC-MCNC: 121.2 MG/DL (ref 0–100)
LEUKOCYTE ESTERASE UR QL STRIP.AUTO: NEGATIVE
LYMPHOCYTES # BLD: 2.5 K/UL (ref 0.8–3.5)
LYMPHOCYTES NFR BLD: 22 % (ref 12–49)
MCH RBC QN AUTO: 30.1 PG (ref 26–34)
MCHC RBC AUTO-ENTMCNC: 32.4 G/DL (ref 30–36.5)
MCV RBC AUTO: 93 FL (ref 80–99)
MONOCYTES # BLD: 0.6 K/UL (ref 0–1)
MONOCYTES NFR BLD: 5 % (ref 5–13)
NEUTS SEG # BLD: 7.9 K/UL (ref 1.8–8)
NEUTS SEG NFR BLD: 70 % (ref 32–75)
NITRITE UR QL STRIP.AUTO: NEGATIVE
NRBC # BLD: 0 K/UL (ref 0–0.01)
NRBC BLD-RTO: 0 PER 100 WBC
PH UR STRIP: 6 (ref 5–8)
PLATELET # BLD AUTO: 359 K/UL (ref 150–400)
PMV BLD AUTO: 9.2 FL (ref 8.9–12.9)
POTASSIUM SERPL-SCNC: 4.3 MMOL/L (ref 3.5–5.1)
PROT SERPL-MCNC: 7.2 G/DL (ref 6.4–8.2)
PROT UR STRIP-MCNC: NEGATIVE MG/DL
RBC # BLD AUTO: 4.85 M/UL (ref 3.8–5.2)
RBC #/AREA URNS HPF: ABNORMAL /HPF (ref 0–5)
SODIUM SERPL-SCNC: 138 MMOL/L (ref 136–145)
SP GR UR REFRACTOMETRY: <1.005 (ref 1–1.03)
SPECIMEN HOLD: NORMAL
T4 SERPL-MCNC: 9 UG/DL (ref 4.8–13.9)
TRIGL SERPL-MCNC: 189 MG/DL
TSH SERPL DL<=0.05 MIU/L-ACNC: 1.26 UIU/ML (ref 0.36–3.74)
UROBILINOGEN UR QL STRIP.AUTO: 0.2 EU/DL (ref 0.2–1)
VLDLC SERPL CALC-MCNC: 37.8 MG/DL
WBC # BLD AUTO: 11.4 K/UL (ref 3.6–11)
WBC URNS QL MICRO: ABNORMAL /HPF (ref 0–4)

## 2024-04-12 LAB
THYROGLOB AB SERPL-ACNC: 14.6 IU/ML (ref 0–0.9)
THYROPEROXIDASE AB SERPL-ACNC: >600 IU/ML (ref 0–34)

## 2024-04-17 ENCOUNTER — PATIENT MESSAGE (OUTPATIENT)
Facility: CLINIC | Age: 44
End: 2024-04-17

## 2024-04-17 PROBLEM — R73.01 IFG (IMPAIRED FASTING GLUCOSE): Status: ACTIVE | Noted: 2024-04-17

## 2024-04-17 PROBLEM — E78.2 MIXED HYPERLIPIDEMIA: Status: ACTIVE | Noted: 2024-04-17

## 2024-04-17 PROBLEM — E55.9 VITAMIN D DEFICIENCY: Status: ACTIVE | Noted: 2024-04-17

## 2024-04-18 ENCOUNTER — PATIENT MESSAGE (OUTPATIENT)
Facility: CLINIC | Age: 44
End: 2024-04-18

## 2024-04-18 RX ORDER — LEVOTHYROXINE SODIUM 150 UG/1
150 TABLET ORAL DAILY
Qty: 90 TABLET | Refills: 1 | Status: SHIPPED | OUTPATIENT
Start: 2024-04-18

## 2024-04-18 NOTE — TELEPHONE ENCOUNTER
From: Chepe Jade  To: José Luis Alonzo  Sent: 4/18/2024 6:33 AM EDT  Subject: Refill Levoxyl    Sorry I can't request it through prescription side it's locked.

## 2024-04-18 NOTE — TELEPHONE ENCOUNTER
PCP: José Luis Alonzo PA-C     Last appt:  4/10/2024    No future appointments.       Requested Prescriptions     Pending Prescriptions Disp Refills    LEVOXYL 150 MCG tablet 90 tablet 1     Sig: Take 1 tablet by mouth daily

## 2024-04-23 RX ORDER — LEVOTHYROXINE SODIUM 0.15 MG/1
150 TABLET ORAL DAILY
Qty: 90 TABLET | Refills: 3 | Status: SHIPPED | OUTPATIENT
Start: 2024-04-23

## 2024-04-23 NOTE — TELEPHONE ENCOUNTER
From: Chepe Jade  To: José Luis Alonzo  Sent: 4/17/2024 6:11 AM EDT  Subject: Test Results    Do I need to make an appointment to come back in to go over them?

## 2024-05-24 NOTE — TELEPHONE ENCOUNTER
PCP: José Luis Alonzo PA-C     Last appt:  4/10/2024    No future appointments.       Requested Prescriptions     Pending Prescriptions Disp Refills    vitamin D (ERGOCALCIFEROL) 1.25 MG (35681 UT) CAPS capsule 12 capsule 1     Sig: Take 1 capsule by mouth once a week    levothyroxine (LEVOXYL) 150 MCG tablet 90 tablet 3     Sig: Take 1 tablet by mouth Daily

## 2024-05-27 RX ORDER — LEVOTHYROXINE SODIUM 0.15 MG/1
150 TABLET ORAL DAILY
Qty: 90 TABLET | Refills: 3 | Status: SHIPPED | OUTPATIENT
Start: 2024-05-27

## 2024-05-27 RX ORDER — ERGOCALCIFEROL 1.25 MG/1
50000 CAPSULE ORAL WEEKLY
Qty: 12 CAPSULE | Refills: 1 | Status: SHIPPED | OUTPATIENT
Start: 2024-05-27

## 2024-06-26 RX ORDER — LEVOTHYROXINE SODIUM 0.15 MG/1
150 TABLET ORAL DAILY
Qty: 90 TABLET | Refills: 1 | Status: SHIPPED | OUTPATIENT
Start: 2024-06-26

## 2024-06-26 NOTE — TELEPHONE ENCOUNTER
PCP: José Luis Alonzo PA-C     Last appt:  4/10/2024    No future appointments.       Requested Prescriptions     Pending Prescriptions Disp Refills    levothyroxine (LEVOXYL) 150 MCG tablet 90 tablet 3     Sig: Take 1 tablet by mouth Daily

## 2024-08-23 NOTE — TELEPHONE ENCOUNTER
Future Appointments:  Future Appointments   Date Time Provider Department Center   4/15/2025  8:30 AM José Luis Alonzo PA-C [facility] DE[facility]        Last Appointment With Me:  Visit date not found     Requested Prescriptions     Pending Prescriptions Disp Refills    levothyroxine (LEVOXYL) 150 MCG tablet 90 tablet 1     Sig: Take 1 tablet by mouth Daily

## 2024-08-24 RX ORDER — LEVOTHYROXINE SODIUM 0.15 MG/1
150 TABLET ORAL DAILY
Qty: 90 TABLET | Refills: 0 | Status: SHIPPED | OUTPATIENT
Start: 2024-08-24

## 2024-10-07 RX ORDER — LEVOTHYROXINE SODIUM 150 UG/1
150 TABLET ORAL DAILY
Qty: 90 TABLET | Refills: 0 | Status: SHIPPED | OUTPATIENT
Start: 2024-10-07

## 2024-10-07 NOTE — TELEPHONE ENCOUNTER
Future Appointments:  Future Appointments   Date Time Provider Department Center   4/15/2025  8:30 AM José Luis Alonzo PA-C [facility] DEP        Last Appointment With Me:  Visit date not found     Requested Prescriptions     Pending Prescriptions Disp Refills    levothyroxine (LEVOXYL) 150 MCG tablet 90 tablet 0     Sig: Take 1 tablet by mouth Daily

## 2024-11-08 RX ORDER — LEVOTHYROXINE SODIUM 150 UG/1
150 TABLET ORAL DAILY
Qty: 90 TABLET | Refills: 1 | Status: SHIPPED | OUTPATIENT
Start: 2024-11-08

## 2024-11-08 NOTE — TELEPHONE ENCOUNTER
PCP: José Luis Alonzo PA-C     Last appt:  4/10/2024      Future Appointments   Date Time Provider Department Center   4/15/2025  8:30 AM José Luis Alonzo PA-C [facility] DEP          Requested Prescriptions     Pending Prescriptions Disp Refills    levothyroxine (LEVOXYL) 150 MCG tablet 90 tablet 0     Sig: Take 1 tablet by mouth Daily

## 2024-12-06 RX ORDER — LEVOTHYROXINE SODIUM 150 UG/1
150 TABLET ORAL DAILY
Qty: 90 TABLET | Refills: 1 | Status: SHIPPED | OUTPATIENT
Start: 2024-12-06

## 2024-12-06 NOTE — TELEPHONE ENCOUNTER
PCP: José Luis Alonzo PA-C     Last appt:  4/10/2024      Future Appointments   Date Time Provider Department Center   4/15/2025  8:30 AM José Luis Alonzo PA-C [facility] ECC DEP          Requested Prescriptions     Pending Prescriptions Disp Refills    LEVOXYL 150 MCG tablet [Pharmacy Med Name: LEVOXYL 0.15MG (150MCG) TABLETS] 90 tablet 1     Sig: TAKE 1 TABLET BY MOUTH DAILY

## 2025-01-20 RX ORDER — LEVOTHYROXINE SODIUM 150 UG/1
150 TABLET ORAL DAILY
Qty: 90 TABLET | Refills: 1 | Status: SHIPPED | OUTPATIENT
Start: 2025-01-20

## 2025-03-21 ENCOUNTER — OFFICE VISIT (OUTPATIENT)
Facility: CLINIC | Age: 45
End: 2025-03-21
Payer: COMMERCIAL

## 2025-03-21 VITALS
TEMPERATURE: 98.6 F | WEIGHT: 191 LBS | SYSTOLIC BLOOD PRESSURE: 118 MMHG | RESPIRATION RATE: 16 BRPM | OXYGEN SATURATION: 98 % | BODY MASS INDEX: 33.84 KG/M2 | HEART RATE: 85 BPM | DIASTOLIC BLOOD PRESSURE: 76 MMHG | HEIGHT: 63 IN

## 2025-03-21 DIAGNOSIS — E03.9 ACQUIRED HYPOTHYROIDISM: ICD-10-CM

## 2025-03-21 DIAGNOSIS — R73.01 IFG (IMPAIRED FASTING GLUCOSE): ICD-10-CM

## 2025-03-21 DIAGNOSIS — J39.9 UPPER RESPIRATORY DISEASE: Primary | ICD-10-CM

## 2025-03-21 DIAGNOSIS — E55.9 VITAMIN D DEFICIENCY: ICD-10-CM

## 2025-03-21 DIAGNOSIS — E78.2 MIXED HYPERLIPIDEMIA: ICD-10-CM

## 2025-03-21 PROCEDURE — 99213 OFFICE O/P EST LOW 20 MIN: CPT | Performed by: PHYSICIAN ASSISTANT

## 2025-03-21 SDOH — ECONOMIC STABILITY: FOOD INSECURITY: WITHIN THE PAST 12 MONTHS, YOU WORRIED THAT YOUR FOOD WOULD RUN OUT BEFORE YOU GOT MONEY TO BUY MORE.: NEVER TRUE

## 2025-03-21 SDOH — ECONOMIC STABILITY: FOOD INSECURITY: WITHIN THE PAST 12 MONTHS, THE FOOD YOU BOUGHT JUST DIDN'T LAST AND YOU DIDN'T HAVE MONEY TO GET MORE.: NEVER TRUE

## 2025-03-21 ASSESSMENT — PATIENT HEALTH QUESTIONNAIRE - PHQ9
1. LITTLE INTEREST OR PLEASURE IN DOING THINGS: NOT AT ALL
SUM OF ALL RESPONSES TO PHQ QUESTIONS 1-9: 0
2. FEELING DOWN, DEPRESSED OR HOPELESS: NOT AT ALL
SUM OF ALL RESPONSES TO PHQ QUESTIONS 1-9: 0

## 2025-03-21 ASSESSMENT — ENCOUNTER SYMPTOMS
ABDOMINAL PAIN: 0
BLOOD IN STOOL: 0
SHORTNESS OF BREATH: 0
VOMITING: 0
NAUSEA: 0
COUGH: 1
DIARRHEA: 0
CONSTIPATION: 0
EYES NEGATIVE: 1

## 2025-03-21 NOTE — PROGRESS NOTES
\"Have you been to the ER, urgent care clinic since your last visit?  Hospitalized since your last visit?\"    NO    “Have you seen or consulted any other health care providers outside our system since your last visit?”    NO    Have you had a mammogram?”   NO    No breast cancer screening on file      “Have you had a pap smear?”    NO    No cervical cancer screening on file            
  Lymphadenopathy:      Cervical: No cervical adenopathy.   Neurological:      General: No focal deficit present.      Mental Status: She is alert and oriented to person, place, and time.   Psychiatric:         Mood and Affect: Mood normal.         Behavior: Behavior normal.         Thought Content: Thought content normal.          No results found for this or any previous visit (from the past 24 hours).     ASSESSMENT AND PLAN  Chepe \"Chepe Jade\" was seen today for acute care.    Diagnoses and all orders for this visit:    Upper respiratory disease  Suspect patient had viral etiology, highly suspect influenza. Was treated with doxy and improvement timeline fits more of a viral improvement timeline than bacteria with treatment.   Has classic post viral symptoms and will use flonase BID for 5 days then as needed. Should monitor for signs of post viral issues like sinusitis, bronchitis or ear infection and can treat as needed     Labs ordered below are for 1 week prior to CPE  Acquired hypothyroidism  -     T4, Free; Future  -     TSH; Future  -     CBC with Auto Differential; Future  -     Urinalysis with Microscopic; Future    IFG (impaired fasting glucose)  -     Comprehensive Metabolic Panel; Future  -     CBC with Auto Differential; Future  -     Urinalysis with Microscopic; Future  -     Hemoglobin A1C; Future    Mixed hyperlipidemia  -     Lipid Panel; Future  -     Urinalysis with Microscopic; Future    Vitamin D deficiency  -     Vitamin D 25 Hydroxy; Future           I have discussed the diagnosis with the patient and the intended plan as seen in the above orders. Patient is in agreement.  The patient has received an after-visit summary and questions were answered concerning future plans.  I have discussed medication side effects and warnings with the patient as well.    José Luis Alonzo PA-C

## 2025-04-08 ENCOUNTER — LAB (OUTPATIENT)
Facility: CLINIC | Age: 45
End: 2025-04-08

## 2025-04-08 DIAGNOSIS — E03.9 ACQUIRED HYPOTHYROIDISM: ICD-10-CM

## 2025-04-08 DIAGNOSIS — E55.9 VITAMIN D DEFICIENCY: ICD-10-CM

## 2025-04-08 DIAGNOSIS — R73.01 IFG (IMPAIRED FASTING GLUCOSE): ICD-10-CM

## 2025-04-08 DIAGNOSIS — E78.2 MIXED HYPERLIPIDEMIA: ICD-10-CM

## 2025-04-09 LAB
25(OH)D3 SERPL-MCNC: 39.4 NG/ML (ref 30–100)
ALBUMIN SERPL-MCNC: 3.7 G/DL (ref 3.5–5)
ALBUMIN/GLOB SERPL: 0.9 (ref 1.1–2.2)
ALP SERPL-CCNC: 104 U/L (ref 45–117)
ALT SERPL-CCNC: 25 U/L (ref 12–78)
ANION GAP SERPL CALC-SCNC: 6 MMOL/L (ref 2–12)
APPEARANCE UR: CLEAR
AST SERPL-CCNC: 16 U/L (ref 15–37)
BACTERIA URNS QL MICRO: NEGATIVE /HPF
BASOPHILS # BLD: 0.04 K/UL (ref 0–0.1)
BASOPHILS NFR BLD: 0.4 % (ref 0–1)
BILIRUB SERPL-MCNC: 0.2 MG/DL (ref 0.2–1)
BILIRUB UR QL: NEGATIVE
BUN SERPL-MCNC: 13 MG/DL (ref 6–20)
BUN/CREAT SERPL: 17 (ref 12–20)
CALCIUM SERPL-MCNC: 9.5 MG/DL (ref 8.5–10.1)
CHLORIDE SERPL-SCNC: 105 MMOL/L (ref 97–108)
CHOLEST SERPL-MCNC: 207 MG/DL
CO2 SERPL-SCNC: 25 MMOL/L (ref 21–32)
COLOR UR: ABNORMAL
CREAT SERPL-MCNC: 0.78 MG/DL (ref 0.55–1.02)
DIFFERENTIAL METHOD BLD: ABNORMAL
EOSINOPHIL # BLD: 0.31 K/UL (ref 0–0.4)
EOSINOPHIL NFR BLD: 2.8 % (ref 0–7)
EPITH CASTS URNS QL MICRO: ABNORMAL /LPF
ERYTHROCYTE [DISTWIDTH] IN BLOOD BY AUTOMATED COUNT: 12.8 % (ref 11.5–14.5)
EST. AVERAGE GLUCOSE BLD GHB EST-MCNC: 120 MG/DL
GLOBULIN SER CALC-MCNC: 3.9 G/DL (ref 2–4)
GLUCOSE SERPL-MCNC: 108 MG/DL (ref 65–100)
GLUCOSE UR STRIP.AUTO-MCNC: NEGATIVE MG/DL
HBA1C MFR BLD: 5.8 % (ref 4–5.6)
HCT VFR BLD AUTO: 42.9 % (ref 35–47)
HDLC SERPL-MCNC: 45 MG/DL
HDLC SERPL: 4.6 (ref 0–5)
HGB BLD-MCNC: 13.7 G/DL (ref 11.5–16)
HGB UR QL STRIP: ABNORMAL
HYALINE CASTS URNS QL MICRO: ABNORMAL /LPF (ref 0–5)
IMM GRANULOCYTES # BLD AUTO: 0.04 K/UL (ref 0–0.04)
IMM GRANULOCYTES NFR BLD AUTO: 0.4 % (ref 0–0.5)
KETONES UR QL STRIP.AUTO: NEGATIVE MG/DL
LDLC SERPL CALC-MCNC: 133.8 MG/DL (ref 0–100)
LEUKOCYTE ESTERASE UR QL STRIP.AUTO: NEGATIVE
LYMPHOCYTES # BLD: 2.46 K/UL (ref 0.8–3.5)
LYMPHOCYTES NFR BLD: 22.4 % (ref 12–49)
MCH RBC QN AUTO: 29.4 PG (ref 26–34)
MCHC RBC AUTO-ENTMCNC: 31.9 G/DL (ref 30–36.5)
MCV RBC AUTO: 92.1 FL (ref 80–99)
MONOCYTES # BLD: 0.42 K/UL (ref 0–1)
MONOCYTES NFR BLD: 3.8 % (ref 5–13)
NEUTS SEG # BLD: 7.7 K/UL (ref 1.8–8)
NEUTS SEG NFR BLD: 70.2 % (ref 32–75)
NITRITE UR QL STRIP.AUTO: NEGATIVE
NRBC # BLD: 0 K/UL (ref 0–0.01)
NRBC BLD-RTO: 0 PER 100 WBC
PH UR STRIP: 7 (ref 5–8)
PLATELET # BLD AUTO: 419 K/UL (ref 150–400)
PMV BLD AUTO: 9.1 FL (ref 8.9–12.9)
POTASSIUM SERPL-SCNC: 4.9 MMOL/L (ref 3.5–5.1)
PROT SERPL-MCNC: 7.6 G/DL (ref 6.4–8.2)
PROT UR STRIP-MCNC: NEGATIVE MG/DL
RBC # BLD AUTO: 4.66 M/UL (ref 3.8–5.2)
RBC #/AREA URNS HPF: ABNORMAL /HPF (ref 0–5)
SODIUM SERPL-SCNC: 136 MMOL/L (ref 136–145)
SP GR UR REFRACTOMETRY: 1.01 (ref 1–1.03)
SPECIMEN HOLD: NORMAL
T4 FREE SERPL-MCNC: 1.1 NG/DL (ref 0.8–1.5)
TRIGL SERPL-MCNC: 141 MG/DL
TSH SERPL DL<=0.05 MIU/L-ACNC: 1.02 UIU/ML (ref 0.36–3.74)
UROBILINOGEN UR QL STRIP.AUTO: 0.2 EU/DL (ref 0.2–1)
VLDLC SERPL CALC-MCNC: 28.2 MG/DL
WBC # BLD AUTO: 11 K/UL (ref 3.6–11)
WBC URNS QL MICRO: ABNORMAL /HPF (ref 0–4)

## 2025-04-15 ENCOUNTER — OFFICE VISIT (OUTPATIENT)
Facility: CLINIC | Age: 45
End: 2025-04-15
Payer: COMMERCIAL

## 2025-04-15 VITALS
TEMPERATURE: 98.3 F | RESPIRATION RATE: 16 BRPM | WEIGHT: 190.8 LBS | OXYGEN SATURATION: 96 % | BODY MASS INDEX: 33.81 KG/M2 | DIASTOLIC BLOOD PRESSURE: 85 MMHG | SYSTOLIC BLOOD PRESSURE: 123 MMHG | HEIGHT: 63 IN | HEART RATE: 74 BPM

## 2025-04-15 DIAGNOSIS — E55.9 VITAMIN D DEFICIENCY: ICD-10-CM

## 2025-04-15 DIAGNOSIS — R31.21 ASYMPTOMATIC MICROSCOPIC HEMATURIA: ICD-10-CM

## 2025-04-15 DIAGNOSIS — R73.01 IFG (IMPAIRED FASTING GLUCOSE): ICD-10-CM

## 2025-04-15 DIAGNOSIS — E78.2 MIXED HYPERLIPIDEMIA: ICD-10-CM

## 2025-04-15 DIAGNOSIS — Z00.00 PHYSICAL EXAM: Primary | ICD-10-CM

## 2025-04-15 DIAGNOSIS — E03.9 ACQUIRED HYPOTHYROIDISM: ICD-10-CM

## 2025-04-15 PROCEDURE — 99396 PREV VISIT EST AGE 40-64: CPT | Performed by: PHYSICIAN ASSISTANT

## 2025-04-15 ASSESSMENT — ENCOUNTER SYMPTOMS
NAUSEA: 0
SHORTNESS OF BREATH: 0
RESPIRATORY NEGATIVE: 1
BLOOD IN STOOL: 0
EYES NEGATIVE: 1
VOMITING: 0
CONSTIPATION: 0
DIARRHEA: 0
ABDOMINAL PAIN: 0

## 2025-04-15 NOTE — PROGRESS NOTES
Chepe Jade  Identified pt with two pt identifiers(name and ).     Chief Complaint   Patient presents with    Annual Exam     Room 4B //        Reviewed record In preparation for visit and have obtained necessary documentation.     1. Have you been to the ER, urgent care clinic or hospitalized since your last visit? No     2. Have you seen or consulted any other health care providers outside of the [facility] since your last visit? Include any pap smears or colon screening. No    Patient does not have an advance directive.     Vitals reviewed with provider.    Health Maintenance reviewed:     Health Maintenance Due   Topic    Cervical cancer screen     Breast cancer screen           Wt Readings from Last 3 Encounters:   04/15/25 86.5 kg (190 lb 12.8 oz)   25 86.6 kg (191 lb)   04/10/24 85.9 kg (189 lb 6.4 oz)        Temp Readings from Last 3 Encounters:   25 98.6 °F (37 °C) (Oral)   04/10/24 98.2 °F (36.8 °C) (Oral)        BP Readings from Last 3 Encounters:   25 118/76   04/10/24 130/84   10/15/21 121/81        Pulse Readings from Last 3 Encounters:   25 85   04/10/24 78   10/15/21 83             No data to display                  Learning Assessment:         4/10/2024     9:00 AM   [facility] AMB LEARNING ASSESSMENT   Primary Learner Patient   level of education SOME COLLEGE   Barriers Factors OTHER   Primary Language ENGLISH   Learning Preference DEMONSTRATION   Answered By Patient   Relationship to Learner SELF         Fall Risk Assessment:   :          No data to display                Abuse Screening:   :          No data to display                   ADL Screening:   :         4/10/2024     9:00 AM   ADL ASSESSMENT   Feeding yourself No Help Needed   Getting from bed to chair No Help Needed   Getting dressed No Help Needed   Bathing or showering No Help Needed   Walk across the room (includes cane/walker) No Help Needed   Using the telphone No Help Needed   Taking

## 2025-04-15 NOTE — PROGRESS NOTES
Chepe Jade is a 44 y.o. year old female seen in clinic today for a yearly physical exam.    Diet: More carbs, eating out more due to baseball practices  Exercise Routine: : standing desk at work   Tobacco use: 1 ppd, previously quit through hypnotist  EtOH use: None  Sleep: no issues, notices it when does not sleep well, only has poor sleep when kids need her  Anxiety or Depression: Doing better    Cancer Screenings  Breast Cancer Screenings: does mehnaz through [facility]  Cervical Cancer Screening: Through [facility]  Colon Cancer Screenin    Vaccines:  declines    she specifically denies any CP, SOB, HA. Dizziness, fevers, chills, N/V/D, urinary symptoms or other bowel changes.    Current Outpatient Medications on File Prior to Visit   Medication Sig Dispense Refill    LEVOXYL 150 MCG tablet Take 1 tablet by mouth daily 90 tablet 1    vitamin D (ERGOCALCIFEROL) 1.25 MG (40656 UT) CAPS capsule Take 1 capsule by mouth once a week 12 capsule 1    Chlorpheniramine Maleate (ALLERGY RELIEF PO) Take by mouth       No current facility-administered medications on file prior to visit.         Allergies   Allergen Reactions    Penicillins Anaphylaxis    Nitrofurantoin Nausea And Vomiting     Past Medical History:   Diagnosis Date    Diabetes (HCC)     gest DM- diet controlled    Thyroid activity decreased     hypo      Past Surgical History:   Procedure Laterality Date     DELIVERY ONLY  2004    emergency x 3     SECTION  2004    CHOLECYSTECTOMY      OTHER SURGICAL HISTORY  10/2004    lap choly    TUBAL LIGATION  2016        Family History   Problem Relation Age of Onset    Thyroid Disease Mother     No Known Problems Father     Diabetes Maternal Grandmother     Stroke Maternal Grandmother     Dementia Maternal Grandfather     Alcohol Abuse Neg Hx     Mental Retardation Neg Hx     Migraines Neg Hx     Psychiatric Disorder Neg Hx     Lung Disease Neg Hx

## 2025-04-23 ENCOUNTER — PATIENT MESSAGE (OUTPATIENT)
Facility: CLINIC | Age: 45
End: 2025-04-23

## 2025-04-23 DIAGNOSIS — Z86.19 HX OF COLD SORES: Primary | ICD-10-CM

## 2025-04-23 RX ORDER — VALACYCLOVIR HYDROCHLORIDE 500 MG/1
500 TABLET, FILM COATED ORAL 2 TIMES DAILY
Qty: 10 TABLET | Refills: 3 | Status: SHIPPED | OUTPATIENT
Start: 2025-04-23 | End: 2025-04-28

## 2025-05-05 RX ORDER — LEVOTHYROXINE SODIUM 150 UG/1
150 TABLET ORAL DAILY
Qty: 90 TABLET | Refills: 3 | Status: SHIPPED | OUTPATIENT
Start: 2025-05-05 | End: 2025-05-08 | Stop reason: SDUPTHER

## 2025-05-05 NOTE — TELEPHONE ENCOUNTER
Future Appointments:  Future Appointments   Date Time Provider Department Center   4/17/2026  8:30 AM José Luis Alonzo PA-C [facility] ECC DEP        Last Appointment With Me:  4/15/2025     Requested Prescriptions     Pending Prescriptions Disp Refills    LEVOXYL 150 MCG tablet 90 tablet 1     Sig: Take 1 tablet by mouth daily

## 2025-05-08 RX ORDER — LEVOTHYROXINE SODIUM 150 UG/1
150 TABLET ORAL DAILY
Qty: 90 TABLET | Refills: 3 | Status: SHIPPED | OUTPATIENT
Start: 2025-05-08

## 2025-05-08 NOTE — TELEPHONE ENCOUNTER
PCP: José Luis Kong PA-C     Last appt:  4/15/2025      Future Appointments   Date Time Provider Department Center   4/17/2026  8:30 AM José Luis Kong PA-C [facility] ECC DEP          Requested Prescriptions     Pending Prescriptions Disp Refills    LEVOXYL 150 MCG tablet 90 tablet 3     Sig: Take 1 tablet by mouth daily     Signed Prescriptions Disp Refills    LEVOXYL 150 MCG tablet 90 tablet 3     Sig: Take 1 tablet by mouth daily     Authorizing Provider: JOSÉ LUIS KONG

## 2025-08-19 RX ORDER — LEVOTHYROXINE SODIUM 150 UG/1
150 TABLET ORAL DAILY
Qty: 90 TABLET | Refills: 2 | Status: SHIPPED | OUTPATIENT
Start: 2025-08-19